# Patient Record
Sex: FEMALE | Race: BLACK OR AFRICAN AMERICAN | NOT HISPANIC OR LATINO | Employment: UNEMPLOYED | URBAN - METROPOLITAN AREA
[De-identification: names, ages, dates, MRNs, and addresses within clinical notes are randomized per-mention and may not be internally consistent; named-entity substitution may affect disease eponyms.]

---

## 2017-02-13 RX ORDER — CYCLOSPORINE 25 MG/1
CAPSULE ORAL
Qty: 540 CAPSULE | Refills: 3 | Status: SHIPPED | OUTPATIENT
Start: 2017-02-13 | End: 2017-06-08 | Stop reason: SDUPTHER

## 2017-04-16 ENCOUNTER — TELEPHONE (OUTPATIENT)
Dept: TRANSPLANT | Age: 67
End: 2017-04-16

## 2017-04-18 ENCOUNTER — TELEPHONE (OUTPATIENT)
Dept: TRANSPLANT | Age: 67
End: 2017-04-18

## 2017-05-01 ENCOUNTER — HOSPITAL ENCOUNTER (OUTPATIENT)
Dept: MAMMOGRAPHY | Age: 67
Discharge: HOME | End: 2017-05-01
Attending: INTERNAL MEDICINE
Payer: MEDICARE

## 2017-05-01 DIAGNOSIS — Z12.31 VISIT FOR SCREENING MAMMOGRAM: ICD-10-CM

## 2017-05-01 PROCEDURE — 77063 BREAST TOMOSYNTHESIS BI: CPT

## 2017-05-03 ENCOUNTER — APPOINTMENT (OUTPATIENT)
Dept: TRANSPLANT | Age: 67
End: 2017-05-03
Attending: INTERNAL MEDICINE

## 2017-05-03 ENCOUNTER — APPOINTMENT (OUTPATIENT)
Dept: LAB | Age: 67
End: 2017-05-03
Attending: INTERNAL MEDICINE

## 2017-05-04 ENCOUNTER — APPOINTMENT (OUTPATIENT)
Dept: ELECTROPHYSIOLOGY | Age: 67
End: 2017-05-04
Attending: INTERNAL MEDICINE

## 2017-05-26 DIAGNOSIS — Z95.0 PACEMAKER: Primary | ICD-10-CM

## 2017-06-01 ENCOUNTER — APPOINTMENT (OUTPATIENT)
Dept: LAB | Age: 67
End: 2017-06-01
Attending: INTERNAL MEDICINE

## 2017-06-01 ENCOUNTER — APPOINTMENT (OUTPATIENT)
Dept: ELECTROPHYSIOLOGY | Age: 67
End: 2017-06-01
Attending: INTERNAL MEDICINE

## 2017-06-01 ENCOUNTER — APPOINTMENT (OUTPATIENT)
Dept: TRANSPLANT | Age: 67
End: 2017-06-01
Attending: INTERNAL MEDICINE

## 2017-06-01 ENCOUNTER — TELEPHONE (OUTPATIENT)
Dept: TRANSPLANT | Age: 67
End: 2017-06-01

## 2017-06-08 ENCOUNTER — TELEPHONE (OUTPATIENT)
Dept: TRANSPLANT | Age: 67
End: 2017-06-08

## 2017-06-08 ENCOUNTER — HOSPITAL ENCOUNTER (OUTPATIENT)
Dept: LAB | Age: 67
Discharge: HOME | End: 2017-06-08
Attending: INTERNAL MEDICINE

## 2017-06-08 ENCOUNTER — OFFICE VISIT (OUTPATIENT)
Dept: TRANSPLANT | Age: 67
End: 2017-06-08
Attending: INTERNAL MEDICINE

## 2017-06-08 ENCOUNTER — HOSPITAL ENCOUNTER (OUTPATIENT)
Dept: GENERAL RADIOLOGY | Age: 67
Discharge: HOME | End: 2017-06-08
Attending: INTERNAL MEDICINE

## 2017-06-08 VITALS
RESPIRATION RATE: 20 BRPM | WEIGHT: 124.2 LBS | DIASTOLIC BLOOD PRESSURE: 100 MMHG | SYSTOLIC BLOOD PRESSURE: 180 MMHG | BODY MASS INDEX: 22.72 KG/M2 | HEART RATE: 85 BPM | OXYGEN SATURATION: 99 %

## 2017-06-08 DIAGNOSIS — Z94.1 HEART TRANSPLANT, ORTHOTOPIC, STATUS (CMD): Primary | ICD-10-CM

## 2017-06-08 DIAGNOSIS — Z94.1 HEART TRANSPLANT, ORTHOTOPIC, STATUS (CMD): ICD-10-CM

## 2017-06-08 LAB
ANION GAP SERPL CALC-SCNC: 13 MMOL/L (ref 10–20)
BASOPHILS # BLD AUTO: 0 K/MCL (ref 0–0.3)
BASOPHILS NFR BLD AUTO: 0 %
BUN SERPL-MCNC: 42 MG/DL (ref 6–20)
BUN/CREAT SERPL: 18 (ref 7–25)
CALCIUM SERPL-MCNC: 9.3 MG/DL (ref 8.4–10.2)
CHLORIDE SERPL-SCNC: 106 MMOL/L (ref 98–107)
CO2 SERPL-SCNC: 27 MMOL/L (ref 21–32)
CREAT SERPL-MCNC: 2.39 MG/DL (ref 0.51–0.95)
CYCLOSPORINE BLD-MCNC: 171 NG/ML (ref 100–400)
DIFFERENTIAL METHOD BLD: ABNORMAL
EOSINOPHIL # BLD AUTO: 0.1 K/MCL (ref 0.1–0.5)
EOSINOPHIL NFR SPEC: 3 %
ERYTHROCYTE [DISTWIDTH] IN BLOOD: 13.7 % (ref 11–15)
GLUCOSE SERPL-MCNC: 84 MG/DL (ref 65–99)
HCT VFR BLD CALC: 31.8 % (ref 36–46.5)
HGB BLD-MCNC: 9.2 G/DL (ref 12–15.5)
LYMPHOCYTES # BLD MANUAL: 0.8 K/MCL (ref 1–4)
LYMPHOCYTES NFR BLD MANUAL: 21 %
MAGNESIUM SERPL-MCNC: 1.8 MG/DL (ref 1.7–2.4)
MCH RBC QN AUTO: 24.9 PG (ref 26–34)
MCHC RBC AUTO-ENTMCNC: 28.9 G/DL (ref 32–36.5)
MCV RBC AUTO: 85.9 FL (ref 78–100)
MONOCYTES # BLD MANUAL: 0.3 K/MCL (ref 0.3–0.9)
MONOCYTES NFR BLD MANUAL: 7 %
NEUTROPHILS # BLD AUTO: 2.6 K/MCL (ref 1.8–7.7)
NEUTROPHILS NFR BLD AUTO: 69 %
PLATELET # BLD: 299 K/MCL (ref 140–450)
POTASSIUM SERPL-SCNC: 4.4 MMOL/L (ref 3.4–5.1)
RBC # BLD: 3.7 MIL/MCL (ref 4–5.2)
SODIUM SERPL-SCNC: 142 MMOL/L (ref 135–145)
WBC # BLD: 3.8 K/MCL (ref 4.2–11)

## 2017-06-08 PROCEDURE — 71020 XR CHEST PA AND LATERAL: CPT

## 2017-06-08 PROCEDURE — 10004026 HB COUNTER RECIPIENT HEART PHASE 6

## 2017-06-08 PROCEDURE — 36415 COLL VENOUS BLD VENIPUNCTURE: CPT

## 2017-06-08 PROCEDURE — 85025 COMPLETE CBC W/AUTO DIFF WBC: CPT

## 2017-06-08 PROCEDURE — 71020 XR CHEST PA AND LATERAL: CPT | Performed by: RADIOLOGY

## 2017-06-08 PROCEDURE — 80048 BASIC METABOLIC PNL TOTAL CA: CPT

## 2017-06-08 PROCEDURE — 83735 ASSAY OF MAGNESIUM: CPT

## 2017-06-08 PROCEDURE — 99212 OFFICE O/P EST SF 10 MIN: CPT

## 2017-06-08 PROCEDURE — 99214 OFFICE O/P EST MOD 30 MIN: CPT | Performed by: INTERNAL MEDICINE

## 2017-06-08 PROCEDURE — 80158 DRUG ASSAY CYCLOSPORINE: CPT

## 2017-06-08 RX ORDER — CYCLOSPORINE 25 MG/1
CAPSULE, LIQUID FILLED ORAL
Qty: 450 CAPSULE | Refills: 3 | Status: SHIPPED | OUTPATIENT
Start: 2017-06-08 | End: 2018-09-14 | Stop reason: DRUGHIGH

## 2017-06-08 RX ORDER — NIFEDIPINE 30 MG/1
30 TABLET, FILM COATED, EXTENDED RELEASE ORAL 2 TIMES DAILY
Qty: 180 TABLET | Refills: 3 | Status: SHIPPED | OUTPATIENT
Start: 2017-06-08 | End: 2017-08-14 | Stop reason: SDUPTHER

## 2017-06-08 RX ORDER — CYCLOSPORINE 25 MG/1
75 CAPSULE, LIQUID FILLED ORAL 2 TIMES DAILY
Qty: 540 CAPSULE | Refills: 3 | Status: SHIPPED | OUTPATIENT
Start: 2017-06-08 | End: 2017-06-08 | Stop reason: DRUGHIGH

## 2017-06-09 ENCOUNTER — TELEPHONE (OUTPATIENT)
Dept: TRANSPLANT | Age: 67
End: 2017-06-09

## 2017-06-12 ENCOUNTER — IMAGING SERVICES (OUTPATIENT)
Dept: CV DIAGNOSTICS | Age: 67
End: 2017-06-12
Attending: INTERNAL MEDICINE

## 2017-06-12 ENCOUNTER — TELEPHONE (OUTPATIENT)
Dept: TRANSPLANT | Age: 67
End: 2017-06-12

## 2017-06-12 DIAGNOSIS — Z94.1 HEART TRANSPLANT, ORTHOTOPIC, STATUS (CMD): ICD-10-CM

## 2017-06-12 LAB
AORTIC VALVE AREA: 2.4 CM2
ASCENDING AORTA (AAD): 2.9 CM
AV MEAN GRADIENT (AVMG): 3.5 MMHG
AV PEAK GRADIENT (AVPG): 6.7 MMHG
AV PEAK VELOCITY (AVPV): 1.3 M/S
DOP CALC LVOT PEAK VEL (LVOTPV): 1 M/S
EST RIGHT VENT SYSTOLIC PRESSURE BY TRICUSPID REGURGITATION JET (RVSP): 22.1 MMHG
INTERVENTRICULAR SEPTUM IN END DIASTOLE (IVSD): 1.2 CM
LEFT INTERNAL DIMENSION IN SYSTOLE (LVSD): 2.5 CM
LEFT VENTRICULAR INTERNAL DIMENSION IN DIASTOLE (LVDD): 4 CM
LEFT VENTRICULAR POSTERIOR WALL IN END DIASTOLE (LVPW): 1.2 CM
LV EF: 64 %
LVOT 2D (LVOTD): 2 CM
LVOT VTI (LVOTVTI): 19.7 CM
MV PEAK A VELOCITY (MVPAV): 0.5 M/S
MV PEAK E VELOCITY (MVPEV): 0.7 M/S
PV PEAK VELOCITY (PVPV): 0.9 M/S
RV TISSUE DOPPLER FREE WALL HEART RATE (RVSTV): 0.1 M/S
TRICUSPID VALVE PEAK REGURGITATION VELOCITY (TRPV): 2.2 M/S
TV ESTIMATED RIGHT ARTERIAL PRESSURE (RAP): 3 MMHG

## 2017-06-12 PROCEDURE — 0399T ECHO M-MODE/2D/DOPPLER (ROUTINE): CPT

## 2017-06-12 PROCEDURE — 76376 3D RENDER W/INTRP POSTPROCES: CPT | Performed by: INTERNAL MEDICINE

## 2017-06-12 PROCEDURE — 0399T ECHO M-MODE/2D/DOPPLER (ROUTINE): CPT | Performed by: INTERNAL MEDICINE

## 2017-06-12 PROCEDURE — 93306 TTE W/DOPPLER COMPLETE: CPT | Performed by: INTERNAL MEDICINE

## 2017-06-13 ENCOUNTER — CLINICAL ABSTRACT (OUTPATIENT)
Dept: TRANSPLANT | Age: 67
End: 2017-06-13

## 2017-06-19 ENCOUNTER — APPOINTMENT (OUTPATIENT)
Dept: GENERAL RADIOLOGY | Age: 67
End: 2017-06-19
Attending: EMERGENCY MEDICINE

## 2017-06-19 ENCOUNTER — HOSPITAL ENCOUNTER (EMERGENCY)
Age: 67
Discharge: HOME | End: 2017-06-19
Attending: EMERGENCY MEDICINE

## 2017-06-19 ENCOUNTER — TELEPHONE (OUTPATIENT)
Dept: TRANSPLANT | Age: 67
End: 2017-06-19

## 2017-06-19 ENCOUNTER — HOSPITAL ENCOUNTER (OUTPATIENT)
Dept: LAB | Age: 67
Discharge: HOME | End: 2017-06-19
Attending: INTERNAL MEDICINE

## 2017-06-19 ENCOUNTER — FOLLOW UP (OUTPATIENT)
Dept: TRANSPLANT | Age: 67
End: 2017-06-19
Attending: INTERNAL MEDICINE

## 2017-06-19 VITALS
HEIGHT: 62 IN | HEART RATE: 86 BPM | TEMPERATURE: 97.6 F | SYSTOLIC BLOOD PRESSURE: 148 MMHG | WEIGHT: 124.9 LBS | DIASTOLIC BLOOD PRESSURE: 62 MMHG | BODY MASS INDEX: 22.98 KG/M2 | OXYGEN SATURATION: 100 %

## 2017-06-19 VITALS
HEART RATE: 80 BPM | TEMPERATURE: 98.4 F | SYSTOLIC BLOOD PRESSURE: 180 MMHG | DIASTOLIC BLOOD PRESSURE: 86 MMHG | OXYGEN SATURATION: 100 % | RESPIRATION RATE: 25 BRPM

## 2017-06-19 DIAGNOSIS — Z94.1 HEART TRANSPLANT, ORTHOTOPIC, STATUS (CMD): ICD-10-CM

## 2017-06-19 DIAGNOSIS — I10 BENIGN ESSENTIAL HYPERTENSION: ICD-10-CM

## 2017-06-19 DIAGNOSIS — N18.30 ANEMIA, CHRONIC RENAL FAILURE, STAGE 3 (MODERATE) (CMD): ICD-10-CM

## 2017-06-19 DIAGNOSIS — D63.1 ANEMIA, CHRONIC RENAL FAILURE, STAGE 3 (MODERATE) (CMD): ICD-10-CM

## 2017-06-19 DIAGNOSIS — J40 BRONCHITIS: Primary | ICD-10-CM

## 2017-06-19 DIAGNOSIS — N18.4 CKD (CHRONIC KIDNEY DISEASE) STAGE 4, GFR 15-29 ML/MIN (CMD): Primary | ICD-10-CM

## 2017-06-19 DIAGNOSIS — M85.89 OSTEOPENIA OF MULTIPLE SITES: ICD-10-CM

## 2017-06-19 DIAGNOSIS — D64.9 ANEMIA, UNSPECIFIED TYPE: ICD-10-CM

## 2017-06-19 LAB
ANION GAP BLD CALC-SCNC: 12 MMOL/L
ANION GAP SERPL CALC-SCNC: 15 MMOL/L (ref 10–20)
APPEARANCE UR: CLEAR
ATRIAL RATE (BPM): 82
BACTERIA #/AREA URNS HPF: NORMAL /HPF
BASE DEFICIT BLDV-SCNC: 2 MMOL/L (ref 0–2)
BASOPHILS # BLD AUTO: 0 K/MCL (ref 0–0.3)
BASOPHILS NFR BLD AUTO: 0 %
BILIRUB UR QL STRIP: NEGATIVE
BNP SERPL-MCNC: 93 PG/ML
BUN BLD-MCNC: 61 MG/DL (ref 6–20)
BUN SERPL-MCNC: 54 MG/DL (ref 6–20)
BUN/CREAT SERPL: 23 (ref 7–25)
CA-I BLD ISE-SCNC: 1.23 MMOL/L (ref 1.15–1.29)
CALCIUM SERPL-MCNC: 9.5 MG/DL (ref 8.4–10.2)
CHLORIDE BLD-SCNC: 106 MMOL/L (ref 98–107)
CHLORIDE SERPL-SCNC: 103 MMOL/L (ref 98–107)
CO2 BLD-SCNC: 25 MMOL/L (ref 19–24)
CO2 SERPL-SCNC: 23 MMOL/L (ref 21–32)
COLOR UR: ABNORMAL
CREAT ?TM UR-MCNC: 56.99 MG/DL
CREAT BLD-MCNC: 2.5 MG/DL (ref 0.51–0.95)
CREAT SERPL-MCNC: 2.38 MG/DL (ref 0.51–0.95)
CROSSMATCH EXPIRE: NORMAL
CYCLOSPORINE BLD-MCNC: 77 NG/ML (ref 100–400)
DIASTOLIC BLOOD PRESSURE: 92
DIFFERENTIAL METHOD BLD: ABNORMAL
EOSINOPHIL # BLD AUTO: 0.2 K/MCL (ref 0.1–0.5)
EOSINOPHIL NFR SPEC: 3 %
ERYTHROCYTE [DISTWIDTH] IN BLOOD: 14 % (ref 11–15)
GLUCOSE BLD-MCNC: 80 MG/DL (ref 65–99)
GLUCOSE SERPL-MCNC: 80 MG/DL (ref 65–99)
GLUCOSE UR STRIP-MCNC: NEGATIVE MG/DL
HCO3 BLDV-SCNC: 25 MMOL/L (ref 22–28)
HCT VFR BLD CALC: 31.9 % (ref 36–46.5)
HCT VFR BLD CALC: 35 % (ref 36–46.5)
HGB BLD-MCNC: 11.9 G/DL (ref 12–15.5)
HGB BLD-MCNC: 9.4 G/DL (ref 12–15.5)
HGB UR QL STRIP: NEGATIVE
HYALINE CASTS #/AREA URNS LPF: NORMAL /LPF (ref 0–5)
KETONES UR STRIP-MCNC: NEGATIVE MG/DL
LEUKOCYTE ESTERASE UR QL STRIP: ABNORMAL
LYMPHOCYTES # BLD MANUAL: 0.7 K/MCL (ref 1–4)
LYMPHOCYTES NFR BLD MANUAL: 13 %
MCH RBC QN AUTO: 25.2 PG (ref 26–34)
MCHC RBC AUTO-ENTMCNC: 29.5 G/DL (ref 32–36.5)
MCV RBC AUTO: 85.5 FL (ref 78–100)
MONOCYTES # BLD MANUAL: 0.2 K/MCL (ref 0.3–0.9)
MONOCYTES NFR BLD MANUAL: 5 %
NEUTROPHILS # BLD AUTO: 4.1 K/MCL (ref 1.8–7.7)
NEUTROPHILS NFR BLD AUTO: 79 %
NITRITE UR QL STRIP: NEGATIVE
P AXIS (DEGREES): 75
PCO2 BLDV: 50 MM HG (ref 38–51)
PH BLDV: 7.31 UNITS (ref 7.35–7.45)
PH UR STRIP: 5 UNITS (ref 5–7)
PLATELET # BLD: 245 K/MCL (ref 140–450)
PO2 BLDV: 20 MM HG (ref 35–42)
POTASSIUM BLD-SCNC: 4.6 MMOL/L (ref 3.4–5.1)
POTASSIUM SERPL-SCNC: 4.4 MMOL/L (ref 3.4–5.1)
PR-INTERVAL (MSEC): 214
PROT ?TM UR-MCNC: 22 MG/DL
PROT UR STRIP-MCNC: NEGATIVE MG/DL
PROT/CREAT UR: 386 MGPR/GCR
QRS-INTERVAL (MSEC): 144
QT-INTERVAL (MSEC): 400
QTC: 467
R AXIS (DEGREES): 107
RBC # BLD: 3.73 MIL/MCL (ref 4–5.2)
RBC #/AREA URNS HPF: NORMAL /HPF (ref 0–3)
REPORT TEXT: NORMAL
SAO2 % BLDV: 28 % (ref 60–80)
SODIUM BLD-SCNC: 138 MMOL/L (ref 135–145)
SODIUM SERPL-SCNC: 137 MMOL/L (ref 135–145)
SP GR UR STRIP: <1.005 (ref 1–1.03)
SPECIMEN SOURCE: ABNORMAL
SQUAMOUS #/AREA URNS HPF: NORMAL /HPF (ref 0–5)
SYSTOLIC BLOOD PRESSURE: 167
T AXIS (DEGREES): 63
TROPONIN I BLD-MCNC: <0.1 NG/ML
URNS CMNT MICRO: NORMAL
UROBILINOGEN UR STRIP-MCNC: 0.2 MG/DL (ref 0–1)
VENTRICULAR RATE EKG/MIN (BPM): 82
WBC # BLD: 5.2 K/MCL (ref 4.2–11)
WBC #/AREA URNS HPF: NORMAL /HPF (ref 0–5)

## 2017-06-19 PROCEDURE — 10002807 HB RX 258: Performed by: EMERGENCY MEDICINE

## 2017-06-19 PROCEDURE — 80048 BASIC METABOLIC PNL TOTAL CA: CPT

## 2017-06-19 PROCEDURE — 84156 ASSAY OF PROTEIN URINE: CPT

## 2017-06-19 PROCEDURE — 71010 XR CHEST AP OR PA: CPT

## 2017-06-19 PROCEDURE — 94640 AIRWAY INHALATION TREATMENT: CPT

## 2017-06-19 PROCEDURE — 99284 EMERGENCY DEPT VISIT MOD MDM: CPT

## 2017-06-19 PROCEDURE — 82803 BLOOD GASES ANY COMBINATION: CPT

## 2017-06-19 PROCEDURE — 80047 BASIC METABLC PNL IONIZED CA: CPT

## 2017-06-19 PROCEDURE — 93005 ELECTROCARDIOGRAM TRACING: CPT | Performed by: EMERGENCY MEDICINE

## 2017-06-19 PROCEDURE — 36415 COLL VENOUS BLD VENIPUNCTURE: CPT

## 2017-06-19 PROCEDURE — 83880 ASSAY OF NATRIURETIC PEPTIDE: CPT

## 2017-06-19 PROCEDURE — 81001 URINALYSIS AUTO W/SCOPE: CPT

## 2017-06-19 PROCEDURE — 96360 HYDRATION IV INFUSION INIT: CPT

## 2017-06-19 PROCEDURE — 10002803 HB RX 637

## 2017-06-19 PROCEDURE — 71010 XR CHEST AP OR PA: CPT | Performed by: RADIOLOGY

## 2017-06-19 PROCEDURE — 84484 ASSAY OF TROPONIN QUANT: CPT

## 2017-06-19 PROCEDURE — 85025 COMPLETE CBC W/AUTO DIFF WBC: CPT

## 2017-06-19 PROCEDURE — 10002803 HB RX 637: Performed by: EMERGENCY MEDICINE

## 2017-06-19 PROCEDURE — 10002801 HB RX 250 W/O HCPCS: Performed by: EMERGENCY MEDICINE

## 2017-06-19 RX ORDER — ALBUTEROL SULFATE 90 UG/1
2 AEROSOL, METERED RESPIRATORY (INHALATION) ONCE
Status: DISCONTINUED | OUTPATIENT
Start: 2017-06-19 | End: 2017-06-19 | Stop reason: HOSPADM

## 2017-06-19 RX ORDER — IPRATROPIUM BROMIDE AND ALBUTEROL SULFATE 2.5; .5 MG/3ML; MG/3ML
3 SOLUTION RESPIRATORY (INHALATION) ONCE
Status: COMPLETED | OUTPATIENT
Start: 2017-06-19 | End: 2017-06-19

## 2017-06-19 RX ORDER — ALBUTEROL SULFATE 90 UG/1
2 AEROSOL, METERED RESPIRATORY (INHALATION) EVERY 4 HOURS PRN
Qty: 8.5 G | Refills: 0 | Status: SHIPPED | OUTPATIENT
Start: 2017-06-19 | End: 2017-10-16 | Stop reason: CLARIF

## 2017-06-19 RX ORDER — AMOXICILLIN AND CLAVULANATE POTASSIUM 875; 125 MG/1; MG/1
1 TABLET, FILM COATED ORAL EVERY 12 HOURS
Qty: 20 TABLET | Refills: 0 | Status: SHIPPED | OUTPATIENT
Start: 2017-06-19 | End: 2017-06-20 | Stop reason: DRUGHIGH

## 2017-06-19 RX ORDER — AMOXICILLIN AND CLAVULANATE POTASSIUM 500; 125 MG/1; MG/1
1 TABLET, FILM COATED ORAL ONCE
Status: COMPLETED | OUTPATIENT
Start: 2017-06-19 | End: 2017-06-19

## 2017-06-19 RX ORDER — ALBUTEROL SULFATE 90 UG/1
AEROSOL, METERED RESPIRATORY (INHALATION)
Status: DISCONTINUED
Start: 2017-06-19 | End: 2017-06-19 | Stop reason: HOSPADM

## 2017-06-19 RX ORDER — PROMETHAZINE HYDROCHLORIDE AND CODEINE PHOSPHATE 6.25; 1 MG/5ML; MG/5ML
5 SYRUP ORAL 4 TIMES DAILY PRN
Qty: 120 ML | Refills: 0 | Status: SHIPPED | OUTPATIENT
Start: 2017-06-19 | End: 2017-10-16 | Stop reason: CLARIF

## 2017-06-19 RX ADMIN — AMOXICILLIN AND CLAVULANATE POTASSIUM 1 TABLET: 500; 125 TABLET, FILM COATED ORAL at 12:34

## 2017-06-19 RX ADMIN — IPRATROPIUM BROMIDE AND ALBUTEROL SULFATE 3 ML: .5; 3 SOLUTION RESPIRATORY (INHALATION) at 09:30

## 2017-06-19 RX ADMIN — SODIUM CHLORIDE 500 ML: 9 INJECTION, SOLUTION INTRAVENOUS at 11:52

## 2017-06-19 ASSESSMENT — ENCOUNTER SYMPTOMS
VOMITING: 0
CHILLS: 0
DIARRHEA: 0
CHEST TIGHTNESS: 0
ABDOMINAL PAIN: 0
FATIGUE: 0
HEADACHES: 0
SORE THROAT: 1
DIZZINESS: 0
EYE PAIN: 0
RHINORRHEA: 0
PSYCHIATRIC NEGATIVE: 1
EYE REDNESS: 0
FEVER: 0
NAUSEA: 0
TROUBLE SWALLOWING: 0
SHORTNESS OF BREATH: 1
COUGH: 1

## 2017-06-19 ASSESSMENT — PAIN SCALES - GENERAL
PAINLEVEL_OUTOF10: 0

## 2017-06-20 ENCOUNTER — TELEPHONE (OUTPATIENT)
Dept: TRANSPLANT | Age: 67
End: 2017-06-20

## 2017-06-20 DIAGNOSIS — J40 BRONCHITIS: Primary | ICD-10-CM

## 2017-06-20 LAB
BACTERIA UR CULT: NORMAL
REPORT STATUS (RPT): NORMAL
SPECIMEN SOURCE: NORMAL

## 2017-06-20 RX ORDER — AMOXICILLIN AND CLAVULANATE POTASSIUM 500; 125 MG/1; MG/1
1 TABLET, FILM COATED ORAL 2 TIMES DAILY
Qty: 20 TABLET | Refills: 0 | Status: SHIPPED | OUTPATIENT
Start: 2017-06-20 | End: 2017-06-30

## 2017-06-21 ENCOUNTER — TELEPHONE (OUTPATIENT)
Dept: TRANSPLANT | Age: 67
End: 2017-06-21

## 2017-06-29 ENCOUNTER — OFFICE VISIT (OUTPATIENT)
Dept: ELECTROPHYSIOLOGY | Age: 67
End: 2017-06-29
Attending: INTERNAL MEDICINE

## 2017-06-29 VITALS
HEIGHT: 62 IN | DIASTOLIC BLOOD PRESSURE: 96 MMHG | SYSTOLIC BLOOD PRESSURE: 170 MMHG | HEART RATE: 83 BPM | WEIGHT: 125 LBS | BODY MASS INDEX: 23 KG/M2

## 2017-06-29 DIAGNOSIS — I48.0 PAF (PAROXYSMAL ATRIAL FIBRILLATION) (CMD): Primary | ICD-10-CM

## 2017-06-29 DIAGNOSIS — Z95.0 PACEMAKER: ICD-10-CM

## 2017-06-29 DIAGNOSIS — I10 BENIGN ESSENTIAL HYPERTENSION: ICD-10-CM

## 2017-06-29 DIAGNOSIS — I42.0 DILATED CARDIOMYOPATHY (CMD): ICD-10-CM

## 2017-06-29 DIAGNOSIS — I44.2 CHB (COMPLETE HEART BLOCK) (CMD): ICD-10-CM

## 2017-06-29 DIAGNOSIS — I47.29 NONSUSTAINED VENTRICULAR TACHYCARDIA (CMD): ICD-10-CM

## 2017-06-29 DIAGNOSIS — Z94.1 HEART TRANSPLANT, ORTHOTOPIC, STATUS (CMD): ICD-10-CM

## 2017-06-29 PROCEDURE — 93280 PM DEVICE PROGR EVAL DUAL: CPT

## 2017-06-29 PROCEDURE — 99213 OFFICE O/P EST LOW 20 MIN: CPT | Performed by: INTERNAL MEDICINE

## 2017-06-29 PROCEDURE — 93280 PM DEVICE PROGR EVAL DUAL: CPT | Performed by: INTERNAL MEDICINE

## 2017-06-29 ASSESSMENT — ENCOUNTER SYMPTOMS
SLEEP DISTURBANCE: 0
DIZZINESS: 0
BLOOD IN STOOL: 0
FATIGUE: 0
SHORTNESS OF BREATH: 0
DIARRHEA: 0
HEADACHES: 0
APPETITE CHANGE: 0
CONSTIPATION: 0
FEVER: 0
CHILLS: 0
BACK PAIN: 0
ROS SKIN COMMENTS: DENIES DISCOMFORT AT DEVICE SITE

## 2017-07-01 RX ORDER — PREDNISONE 2.5 MG/1
2.5 TABLET ORAL
Qty: 90 TABLET | Refills: 3 | Status: SHIPPED | OUTPATIENT
Start: 2017-07-01 | End: 2021-03-23 | Stop reason: SDUPTHER

## 2017-07-01 RX ORDER — PREDNISONE 2.5 MG/1
TABLET ORAL
Qty: 90 TABLET | Refills: 0 | Status: CANCELLED | OUTPATIENT
Start: 2017-07-01

## 2017-07-13 ENCOUNTER — APPOINTMENT (OUTPATIENT)
Dept: ELECTROPHYSIOLOGY | Age: 67
End: 2017-07-13
Attending: INTERNAL MEDICINE

## 2017-08-03 ENCOUNTER — HOSPITAL ENCOUNTER (OUTPATIENT)
Dept: LAB | Age: 67
Discharge: HOME | End: 2017-08-03
Attending: INTERNAL MEDICINE

## 2017-08-03 ENCOUNTER — FOLLOW UP (OUTPATIENT)
Dept: TRANSPLANT | Age: 67
End: 2017-08-03
Attending: INTERNAL MEDICINE

## 2017-08-03 VITALS
HEIGHT: 62 IN | WEIGHT: 126.9 LBS | DIASTOLIC BLOOD PRESSURE: 74 MMHG | TEMPERATURE: 98 F | HEART RATE: 82 BPM | OXYGEN SATURATION: 99 % | BODY MASS INDEX: 23.35 KG/M2 | SYSTOLIC BLOOD PRESSURE: 162 MMHG

## 2017-08-03 DIAGNOSIS — I48.0 PAF (PAROXYSMAL ATRIAL FIBRILLATION) (CMD): ICD-10-CM

## 2017-08-03 DIAGNOSIS — Z94.1 HEART TRANSPLANT, ORTHOTOPIC, STATUS (CMD): ICD-10-CM

## 2017-08-03 DIAGNOSIS — I10 BENIGN ESSENTIAL HYPERTENSION: ICD-10-CM

## 2017-08-03 DIAGNOSIS — D84.9 IMMUNOSUPPRESSION (CMD): ICD-10-CM

## 2017-08-03 DIAGNOSIS — D64.9 ANEMIA, UNSPECIFIED TYPE: ICD-10-CM

## 2017-08-03 DIAGNOSIS — N18.4 CKD (CHRONIC KIDNEY DISEASE) STAGE 4, GFR 15-29 ML/MIN (CMD): Primary | ICD-10-CM

## 2017-08-03 DIAGNOSIS — N18.4 ANEMIA, CHRONIC RENAL FAILURE, STAGE 4 (SEVERE) (CMD): ICD-10-CM

## 2017-08-03 DIAGNOSIS — D63.1 ANEMIA, CHRONIC RENAL FAILURE, STAGE 4 (SEVERE) (CMD): ICD-10-CM

## 2017-08-03 DIAGNOSIS — N18.4 CKD (CHRONIC KIDNEY DISEASE) STAGE 4, GFR 15-29 ML/MIN (CMD): ICD-10-CM

## 2017-08-03 LAB
25(OH)D3+25(OH)D2 SERPL-MCNC: 31.1 NG/ML (ref 30–100)
ANION GAP SERPL CALC-SCNC: 15 MMOL/L (ref 10–20)
BUN SERPL-MCNC: 52 MG/DL (ref 6–20)
BUN/CREAT SERPL: 22 (ref 7–25)
CALCIUM SERPL-MCNC: 9.4 MG/DL (ref 8.4–10.2)
CHLORIDE SERPL-SCNC: 105 MMOL/L (ref 98–107)
CO2 SERPL-SCNC: 24 MMOL/L (ref 21–32)
CREAT SERPL-MCNC: 2.38 MG/DL (ref 0.51–0.95)
GLUCOSE SERPL-MCNC: 94 MG/DL (ref 65–99)
HAV IGG+IGM SER QL: POSITIVE
HAV IGM SER QL: NEGATIVE
HBV CORE IGG+IGM SER QL: NEGATIVE
HBV SURFACE AB SER QL: NEGATIVE
HBV SURFACE AB SERPL IA-ACNC: <3.1 IU/L
HBV SURFACE AG SER QL: NEGATIVE
HCV AB SER QL: NEGATIVE
PHOSPHATE SERPL-MCNC: 4.5 MG/DL (ref 2.4–4.7)
POTASSIUM SERPL-SCNC: 4.3 MMOL/L (ref 3.4–5.1)
PTH-INTACT SERPL-MCNC: 123 PG/ML (ref 14–72)
SODIUM SERPL-SCNC: 140 MMOL/L (ref 135–145)

## 2017-08-03 PROCEDURE — 84100 ASSAY OF PHOSPHORUS: CPT

## 2017-08-03 PROCEDURE — 87340 HEPATITIS B SURFACE AG IA: CPT

## 2017-08-03 PROCEDURE — 80048 BASIC METABOLIC PNL TOTAL CA: CPT

## 2017-08-03 PROCEDURE — 10004095 HB COUNTER, VISIT, NON TRANSPLANT

## 2017-08-03 PROCEDURE — 99211 OFF/OP EST MAY X REQ PHY/QHP: CPT

## 2017-08-03 PROCEDURE — 82306 VITAMIN D 25 HYDROXY: CPT

## 2017-08-03 PROCEDURE — 86709 HEPATITIS A IGM ANTIBODY: CPT

## 2017-08-03 PROCEDURE — 86704 HEP B CORE ANTIBODY TOTAL: CPT

## 2017-08-03 PROCEDURE — 83970 ASSAY OF PARATHORMONE: CPT

## 2017-08-03 PROCEDURE — 86708 HEPATITIS A ANTIBODY: CPT

## 2017-08-03 PROCEDURE — 86803 HEPATITIS C AB TEST: CPT

## 2017-08-03 PROCEDURE — 86706 HEP B SURFACE ANTIBODY: CPT

## 2017-08-03 PROCEDURE — 36415 COLL VENOUS BLD VENIPUNCTURE: CPT

## 2017-08-04 ENCOUNTER — TELEPHONE (OUTPATIENT)
Dept: TRANSPLANT | Age: 67
End: 2017-08-04

## 2017-08-15 RX ORDER — NIFEDIPINE 30 MG/1
TABLET, FILM COATED ORAL
Qty: 180 TABLET | Refills: 1 | Status: SHIPPED | OUTPATIENT
Start: 2017-08-15 | End: 2019-10-16 | Stop reason: SDUPTHER

## 2017-09-06 ENCOUNTER — TELEPHONE (OUTPATIENT)
Dept: TRANSPLANT | Age: 67
End: 2017-09-06

## 2017-09-06 ENCOUNTER — HOSPITAL ENCOUNTER (OUTPATIENT)
Dept: ULTRASOUND IMAGING | Age: 67
End: 2017-09-06
Attending: INTERNAL MEDICINE

## 2017-09-06 DIAGNOSIS — N18.30 CHRONIC KIDNEY DISEASE (CKD), STAGE III (MODERATE) (CMD): Primary | ICD-10-CM

## 2017-09-19 ENCOUNTER — APPOINTMENT (OUTPATIENT)
Dept: ULTRASOUND IMAGING | Age: 67
End: 2017-09-19
Attending: INTERNAL MEDICINE

## 2017-09-26 ENCOUNTER — TELEPHONE (OUTPATIENT)
Dept: ULTRASOUND IMAGING | Age: 67
End: 2017-09-26

## 2017-09-27 ENCOUNTER — APPOINTMENT (OUTPATIENT)
Dept: ULTRASOUND IMAGING | Age: 67
End: 2017-09-27
Attending: INTERNAL MEDICINE

## 2017-09-27 ENCOUNTER — TELEPHONE (OUTPATIENT)
Dept: ULTRASOUND IMAGING | Age: 67
End: 2017-09-27

## 2017-09-27 ENCOUNTER — HOSPITAL ENCOUNTER (OUTPATIENT)
Dept: ULTRASOUND IMAGING | Age: 67
Discharge: HOME | End: 2017-09-27
Attending: INTERNAL MEDICINE

## 2017-09-27 ENCOUNTER — CLINICAL ABSTRACT (OUTPATIENT)
Dept: TRANSPLANT | Age: 67
End: 2017-09-27

## 2017-09-27 DIAGNOSIS — N18.4 CHRONIC KIDNEY DISEASE, STAGE IV (SEVERE) (CMD): Primary | ICD-10-CM

## 2017-09-27 DIAGNOSIS — N32.89 BLADDER WALL THICKENING: Primary | ICD-10-CM

## 2017-09-27 DIAGNOSIS — N18.4 CKD (CHRONIC KIDNEY DISEASE) STAGE 4, GFR 15-29 ML/MIN (CMD): ICD-10-CM

## 2017-09-27 DIAGNOSIS — N18.30 CHRONIC KIDNEY DISEASE (CKD), STAGE III (MODERATE) (CMD): ICD-10-CM

## 2017-09-27 PROCEDURE — 76770 US EXAM ABDO BACK WALL COMP: CPT

## 2017-09-27 PROCEDURE — 76857 US EXAM PELVIC LIMITED: CPT

## 2017-09-27 PROCEDURE — 76770 US EXAM ABDO BACK WALL COMP: CPT | Performed by: RADIOLOGY

## 2017-09-28 ENCOUNTER — TELEPHONE (OUTPATIENT)
Dept: TRANSPLANT | Age: 67
End: 2017-09-28

## 2017-09-28 PROBLEM — Z91.89 STROKE RISK: Status: ACTIVE | Noted: 2017-09-28

## 2017-10-09 ENCOUNTER — TELEPHONE (OUTPATIENT)
Dept: TRANSPLANT | Age: 67
End: 2017-10-09

## 2017-10-10 ENCOUNTER — APPOINTMENT (OUTPATIENT)
Dept: LAB | Age: 67
End: 2017-10-10

## 2017-10-10 ENCOUNTER — OFFICE VISIT (OUTPATIENT)
Dept: UROLOGY | Age: 67
End: 2017-10-10
Attending: INTERNAL MEDICINE

## 2017-10-10 VITALS — BODY MASS INDEX: 21.34 KG/M2 | HEIGHT: 64 IN | WEIGHT: 125 LBS

## 2017-10-10 DIAGNOSIS — N32.89 BLADDER WALL THICKENING: Primary | ICD-10-CM

## 2017-10-10 DIAGNOSIS — R30.0 DYSURIA: ICD-10-CM

## 2017-10-10 LAB
APPEARANCE UR: CLEAR
BILIRUB UR QL: ABNORMAL
COLOR UR: YELLOW
GLUCOSE UR-MCNC: ABNORMAL MG/DL
HGB UR QL: ABNORMAL
KETONES UR-MCNC: ABNORMAL MG/DL
LEUKOCYTE ESTERASE UR QL STRIP: ABNORMAL
NITRITE UR QL: ABNORMAL
PH UR: 5 [PH]
PROT UR QL: ABNORMAL
SP GR UR: 1.02
SPECIMEN SOURCE: ABNORMAL
UROBILINOGEN UR QL: ABNORMAL

## 2017-10-10 PROCEDURE — 99243 OFF/OP CNSLTJ NEW/EST LOW 30: CPT | Performed by: UROLOGY

## 2017-10-10 PROCEDURE — 81002 URINALYSIS NONAUTO W/O SCOPE: CPT | Performed by: UROLOGY

## 2017-10-12 LAB
BACTERIA UR CULT: NORMAL
REPORT STATUS (RPT): NORMAL
SPECIMEN SOURCE: NORMAL

## 2017-10-16 ENCOUNTER — OFFICE VISIT (OUTPATIENT)
Dept: UROLOGY | Age: 67
End: 2017-10-16

## 2017-10-16 VITALS
TEMPERATURE: 97.5 F | DIASTOLIC BLOOD PRESSURE: 90 MMHG | BODY MASS INDEX: 22.82 KG/M2 | HEIGHT: 62 IN | SYSTOLIC BLOOD PRESSURE: 150 MMHG | WEIGHT: 124 LBS

## 2017-10-16 DIAGNOSIS — R39.9 GU (GENITOURINARY) SYMPTOMS: Primary | ICD-10-CM

## 2017-10-16 LAB
APPEARANCE UR: CLEAR
BILIRUB UR QL: NORMAL
COLOR UR: YELLOW
GLUCOSE UR-MCNC: NORMAL MG/DL
HGB UR QL: NORMAL
KETONES UR-MCNC: NORMAL MG/DL
LEUKOCYTE ESTERASE UR QL STRIP: NORMAL
NITRITE UR QL: NORMAL
PH UR: 6 [PH]
PROT UR QL: NORMAL
SP GR UR: 1.01
SPECIMEN SOURCE: NORMAL
UROBILINOGEN UR QL: NORMAL

## 2017-10-16 PROCEDURE — 81002 URINALYSIS NONAUTO W/O SCOPE: CPT | Performed by: UROLOGY

## 2017-10-16 PROCEDURE — 52000 CYSTOURETHROSCOPY: CPT | Performed by: UROLOGY

## 2017-11-02 ENCOUNTER — OFFICE VISIT (OUTPATIENT)
Dept: ELECTROPHYSIOLOGY | Age: 67
End: 2017-11-02
Attending: INTERNAL MEDICINE

## 2017-11-02 VITALS
WEIGHT: 125 LBS | HEART RATE: 80 BPM | DIASTOLIC BLOOD PRESSURE: 76 MMHG | SYSTOLIC BLOOD PRESSURE: 140 MMHG | BODY MASS INDEX: 22.15 KG/M2 | HEIGHT: 63 IN

## 2017-11-02 DIAGNOSIS — Z95.0 PACEMAKER: ICD-10-CM

## 2017-11-02 DIAGNOSIS — I42.0 DILATED CARDIOMYOPATHY (CMD): ICD-10-CM

## 2017-11-02 DIAGNOSIS — Z94.1 HEART TRANSPLANT, ORTHOTOPIC, STATUS (CMD): ICD-10-CM

## 2017-11-02 DIAGNOSIS — I47.29 NONSUSTAINED VENTRICULAR TACHYCARDIA (CMD): ICD-10-CM

## 2017-11-02 DIAGNOSIS — I44.2 CHB (COMPLETE HEART BLOCK) (CMD): Primary | ICD-10-CM

## 2017-11-02 DIAGNOSIS — Z91.89 STROKE RISK: ICD-10-CM

## 2017-11-02 PROCEDURE — 93280 PM DEVICE PROGR EVAL DUAL: CPT

## 2017-11-02 PROCEDURE — 93280 PM DEVICE PROGR EVAL DUAL: CPT | Performed by: INTERNAL MEDICINE

## 2017-11-10 ENCOUNTER — TELEPHONE (OUTPATIENT)
Dept: TRANSPLANT | Age: 67
End: 2017-11-10

## 2017-11-13 ENCOUNTER — TELEPHONE (OUTPATIENT)
Dept: TRANSPLANT | Age: 67
End: 2017-11-13

## 2017-11-13 ENCOUNTER — APPOINTMENT (OUTPATIENT)
Dept: LAB | Age: 67
End: 2017-11-13
Attending: INTERNAL MEDICINE

## 2017-11-13 ENCOUNTER — APPOINTMENT (OUTPATIENT)
Dept: TRANSPLANT | Age: 67
End: 2017-11-13
Attending: INTERNAL MEDICINE

## 2017-11-21 ENCOUNTER — CLINICAL ABSTRACT (OUTPATIENT)
Dept: TRANSPLANT | Age: 67
End: 2017-11-21

## 2017-11-21 DIAGNOSIS — E78.00 HYPERCHOLESTEROLEMIA: ICD-10-CM

## 2017-11-21 DIAGNOSIS — Z94.1 HEART TRANSPLANT, ORTHOTOPIC, STATUS (CMD): Primary | ICD-10-CM

## 2017-11-21 DIAGNOSIS — Z12.31 ENCOUNTER FOR SCREENING MAMMOGRAM FOR HIGH-RISK PATIENT: ICD-10-CM

## 2017-11-21 DIAGNOSIS — E03.9 HYPOTHYROIDISM, UNSPECIFIED TYPE: ICD-10-CM

## 2017-11-21 DIAGNOSIS — N18.4 CKD (CHRONIC KIDNEY DISEASE), STAGE IV (CMD): ICD-10-CM

## 2017-11-21 DIAGNOSIS — R73.09 ELEVATED HEMOGLOBIN A1C: ICD-10-CM

## 2017-11-30 ENCOUNTER — TELEPHONE (OUTPATIENT)
Dept: TRANSPLANT | Age: 67
End: 2017-11-30

## 2017-12-04 ENCOUNTER — HOSPITAL ENCOUNTER (OUTPATIENT)
Dept: CT IMAGING | Age: 67
Discharge: HOME | End: 2017-12-04
Attending: PODIATRIST
Payer: MEDICARE

## 2017-12-04 DIAGNOSIS — M72.2 PLANTAR FASCIITIS: ICD-10-CM

## 2017-12-04 PROCEDURE — 73700 CT LOWER EXTREMITY W/O DYE: CPT

## 2017-12-04 RX ORDER — LEVOTHYROXINE SODIUM 0.07 MG/1
TABLET ORAL
Qty: 90 TABLET | Refills: 3 | Status: SHIPPED | OUTPATIENT
Start: 2017-12-04 | End: 2018-12-08 | Stop reason: SDUPTHER

## 2017-12-18 RX ORDER — MYCOPHENOLATE MOFETIL 250 MG/1
CAPSULE ORAL
Qty: 180 CAPSULE | Refills: 0 | Status: SHIPPED | OUTPATIENT
Start: 2017-12-18 | End: 2018-09-14 | Stop reason: DRUGHIGH

## 2017-12-19 ENCOUNTER — CLINICAL ABSTRACT (OUTPATIENT)
Dept: TRANSPLANT | Age: 67
End: 2017-12-19

## 2018-01-01 ENCOUNTER — EXTERNAL RECORD (OUTPATIENT)
Dept: OTHER | Age: 68
End: 2018-01-01

## 2018-01-05 LAB
ANION GAP SERPL CALC-SCNC: 14.2 MEQ/L (ref 10–20)
APTT PPP: 39.3 S (ref 22.9–35.8)
BASOPHILS NFR BLD: 0.6 % (ref 0–1)
BUN SERPL-MCNC: 42 MG/DL (ref 7–22)
CALCIUM SERPL-MCNC: 9.9 MG/DL (ref 8.5–10.5)
CHLORIDE SERPL-SCNC: 104 MEQ/L (ref 95–109)
CO2 SERPL-SCNC: 26 MEQ/L (ref 24–32)
CREAT SERPL-MCNC: 2.18 MG/DL (ref 0.5–1.4)
CYCLOSPORINE BLD-MCNC: 89.5 NG/ML (ref 50–350)
EOSINOPHIL # BLD: 0.1 K/CMM (ref 0–0.5)
EOSINOPHIL NFR BLD: 2.1 % (ref 0–4)
ERYTHROCYTE [DISTWIDTH] IN BLOOD: 15.4 % (ref 11.5–14.5)
GLUCOSE SERPL-MCNC: 87 MG/DL (ref 70–99)
HCT VFR BLD CALC: 31.8 % (ref 36–48)
HGB BLD-MCNC: 9.9 G/DL (ref 12–16)
INR PPP: 1 (ref 0.85–1.17)
LYMPHOCYTES # BLD: 1.1 K/CMM (ref 1–5.5)
LYMPHOCYTES NFR BLD: 22.2 % (ref 20–40)
MAGNESIUM SERPL-MCNC: 1.8 MG/DL (ref 1.8–2.4)
MCH RBC QN AUTO: 25.6 PG (ref 27–31)
MCHC RBC AUTO-ENTMCNC: 31.1 G/DL (ref 32–36)
MCV RBC AUTO: 82.4 FL (ref 80–98)
MONOCYTES # BLD: 0.3 K/CMM (ref 0–0.8)
MONOCYTES NFR BLD: 6.3 % (ref 2–12)
MPV (OFFPRE2): 8.8 FL (ref 7.4–10.4)
NEUTROPHILS # BLD: 3.5 K/CMM (ref 1.5–8.1)
NEUTROPHILS NFR BLD: 68.8 % (ref 45–75)
PLATELET # BLD: 172 K/CMM (ref 133–450)
POTASSIUM SERPL-SCNC: 4.2 MEQ/L (ref 3.5–5.1)
PROTHROMBIN TIME: 13.2 S (ref 12–14.7)
RBC # BLD: 3.86 M/CMM (ref 42–5.4)
SODIUM SERPL-SCNC: 140 MEQ/L (ref 135–145)
WBC # BLD: 5 K/CMM (ref 3.7–10.4)

## 2018-02-02 ENCOUNTER — HOSPITAL ENCOUNTER (OUTPATIENT)
Dept: LAB | Age: 68
Discharge: HOME | End: 2018-02-02
Payer: MEDICARE

## 2018-02-02 ENCOUNTER — CLINICAL ABSTRACT (OUTPATIENT)
Dept: TRANSPLANT | Age: 68
End: 2018-02-02

## 2018-02-02 PROCEDURE — 88304 TISSUE EXAM BY PATHOLOGIST: CPT | Performed by: PODIATRIST

## 2018-02-02 PROCEDURE — 88305 TISSUE EXAM BY PATHOLOGIST: CPT | Performed by: PODIATRIST

## 2018-02-08 ENCOUNTER — TELEPHONE (OUTPATIENT)
Dept: ELECTROPHYSIOLOGY | Age: 68
End: 2018-02-08

## 2018-02-19 RX ORDER — MYCOPHENOLATE MOFETIL 250 MG/1
CAPSULE ORAL
Qty: 180 CAPSULE | Refills: 1 | Status: SHIPPED | OUTPATIENT
Start: 2018-02-19 | End: 2018-09-14 | Stop reason: SDUPTHER

## 2018-02-19 RX ORDER — MYCOPHENOLATE MOFETIL 500 MG/1
TABLET ORAL
Qty: 180 TABLET | Refills: 1 | Status: SHIPPED | OUTPATIENT
Start: 2018-02-19 | End: 2018-08-26 | Stop reason: SDUPTHER

## 2018-02-20 RX ORDER — CYCLOSPORINE 25 MG/1
CAPSULE, LIQUID FILLED ORAL
Qty: 450 CAPSULE | Refills: 1 | Status: SHIPPED | OUTPATIENT
Start: 2018-02-20 | End: 2018-09-13 | Stop reason: SDUPTHER

## 2018-02-26 ENCOUNTER — REMOTE DEVICE CHECK (OUTPATIENT)
Dept: ELECTROPHYSIOLOGY | Age: 68
End: 2018-02-26
Attending: INTERNAL MEDICINE

## 2018-02-26 DIAGNOSIS — Z95.0 CARDIAC PACEMAKER IN SITU: Primary | ICD-10-CM

## 2018-02-26 PROCEDURE — 93296 REM INTERROG EVL PM/IDS: CPT

## 2018-02-26 PROCEDURE — 93294 REM INTERROG EVL PM/LDLS PM: CPT | Performed by: INTERNAL MEDICINE

## 2018-05-03 ENCOUNTER — APPOINTMENT (OUTPATIENT)
Dept: ELECTROPHYSIOLOGY | Age: 68
End: 2018-05-03
Attending: INTERNAL MEDICINE

## 2018-06-18 ENCOUNTER — HOSPITAL ENCOUNTER (OUTPATIENT)
Dept: MAMMOGRAPHY | Age: 68
Discharge: HOME | End: 2018-06-18
Attending: INTERNAL MEDICINE
Payer: MEDICARE

## 2018-06-18 DIAGNOSIS — Z12.31 VISIT FOR SCREENING MAMMOGRAM: ICD-10-CM

## 2018-06-18 PROCEDURE — 77063 BREAST TOMOSYNTHESIS BI: CPT

## 2018-07-03 ENCOUNTER — TELEPHONE (OUTPATIENT)
Dept: TRANSPLANT | Age: 68
End: 2018-07-03

## 2018-07-05 ENCOUNTER — TELEPHONE (OUTPATIENT)
Dept: TRANSPLANT | Age: 68
End: 2018-07-05

## 2018-08-09 ENCOUNTER — OFFICE VISIT (OUTPATIENT)
Dept: ELECTROPHYSIOLOGY | Age: 68
End: 2018-08-09
Attending: INTERNAL MEDICINE

## 2018-08-09 VITALS
SYSTOLIC BLOOD PRESSURE: 132 MMHG | HEIGHT: 62 IN | BODY MASS INDEX: 23 KG/M2 | HEART RATE: 81 BPM | WEIGHT: 125 LBS | DIASTOLIC BLOOD PRESSURE: 72 MMHG

## 2018-08-09 DIAGNOSIS — I10 BENIGN ESSENTIAL HYPERTENSION: ICD-10-CM

## 2018-08-09 DIAGNOSIS — I48.0 PAF (PAROXYSMAL ATRIAL FIBRILLATION) (CMD): ICD-10-CM

## 2018-08-09 DIAGNOSIS — Z94.1 HEART TRANSPLANT, ORTHOTOPIC, STATUS (CMD): ICD-10-CM

## 2018-08-09 DIAGNOSIS — Z95.0 CARDIAC PACEMAKER IN SITU: ICD-10-CM

## 2018-08-09 DIAGNOSIS — I47.29 NONSUSTAINED VENTRICULAR TACHYCARDIA (CMD): ICD-10-CM

## 2018-08-09 DIAGNOSIS — I42.0 DILATED CARDIOMYOPATHY (CMD): Primary | ICD-10-CM

## 2018-08-09 DIAGNOSIS — Z95.0 PACEMAKER: ICD-10-CM

## 2018-08-09 DIAGNOSIS — I44.2 CHB (COMPLETE HEART BLOCK) (CMD): ICD-10-CM

## 2018-08-09 DIAGNOSIS — Z91.89 STROKE RISK: ICD-10-CM

## 2018-08-09 PROCEDURE — 93280 PM DEVICE PROGR EVAL DUAL: CPT | Performed by: INTERNAL MEDICINE

## 2018-08-09 PROCEDURE — 93280 PM DEVICE PROGR EVAL DUAL: CPT

## 2018-08-09 RX ORDER — ERGOCALCIFEROL 1.25 MG/1
50000 CAPSULE ORAL
COMMUNITY
End: 2021-02-05 | Stop reason: SDUPTHER

## 2018-08-27 RX ORDER — MYCOPHENOLATE MOFETIL 500 MG/1
TABLET ORAL
Qty: 180 TABLET | Refills: 0 | Status: SHIPPED | OUTPATIENT
Start: 2018-08-27 | End: 2018-12-06 | Stop reason: SDUPTHER

## 2018-09-06 ENCOUNTER — TELEPHONE (OUTPATIENT)
Dept: TRANSPLANT | Age: 68
End: 2018-09-06

## 2018-09-06 RX ORDER — CYCLOSPORINE 25 MG/1
CAPSULE ORAL
Qty: 450 CAPSULE | Refills: 0 | OUTPATIENT
Start: 2018-09-06

## 2018-09-13 ENCOUNTER — TELEPHONE (OUTPATIENT)
Dept: TRANSPLANT | Age: 68
End: 2018-09-13

## 2018-09-13 DIAGNOSIS — Z94.1 HEART TRANSPLANT, ORTHOTOPIC, STATUS (CMD): Primary | ICD-10-CM

## 2018-09-13 RX ORDER — CYCLOSPORINE 25 MG/1
CAPSULE, LIQUID FILLED ORAL
Qty: 450 CAPSULE | Refills: 1 | Status: SHIPPED | OUTPATIENT
Start: 2018-09-13 | End: 2018-09-14 | Stop reason: DRUGHIGH

## 2018-09-14 ENCOUNTER — CLINICAL ABSTRACT (OUTPATIENT)
Dept: TRANSPLANT | Age: 68
End: 2018-09-14

## 2018-09-14 RX ORDER — ROSUVASTATIN CALCIUM 5 MG/1
5 TABLET, COATED ORAL DAILY
COMMUNITY
End: 2018-12-04 | Stop reason: CLARIF

## 2018-09-14 RX ORDER — CYCLOSPORINE 25 MG/1
75 CAPSULE, LIQUID FILLED ORAL 2 TIMES DAILY
COMMUNITY
End: 2018-09-18 | Stop reason: SDUPTHER

## 2018-09-14 RX ORDER — NIFEDIPINE 30 MG/1
30 TABLET, FILM COATED, EXTENDED RELEASE ORAL 2 TIMES DAILY
COMMUNITY
End: 2019-10-16 | Stop reason: SDUPTHER

## 2018-09-18 RX ORDER — CYCLOSPORINE 25 MG/1
75 CAPSULE, LIQUID FILLED ORAL 2 TIMES DAILY
Qty: 540 CAPSULE | Refills: 3 | Status: SHIPPED | OUTPATIENT
Start: 2018-09-18 | End: 2019-10-23 | Stop reason: SDUPTHER

## 2018-10-02 ENCOUNTER — CLINICAL ABSTRACT (OUTPATIENT)
Dept: TRANSPLANT | Age: 68
End: 2018-10-02

## 2018-10-02 LAB
ANION GAP SERPL CALC-SCNC: ABNORMAL MMOL/L
BUN SERPL-MCNC: 37 MG/DL (ref 7–25)
BUN/CREAT SERPL: 15 (ref 6–22)
CALCIUM SERPL-MCNC: 9.7 MG/DL (ref 8.6–10.4)
CHLORIDE SERPL-SCNC: 102 MMOL/L (ref 98–110)
CO2 SERPL-SCNC: 24 MMOL/L (ref 20–32)
CREAT SERPL-MCNC: 2.43 MG/DL (ref 0.6–0.93)
GLUCOSE SERPL-MCNC: 88 MG/DL (ref 65–99)
LENGTH OF FAST TIME PATIENT: ABNORMAL H
POTASSIUM SERPL-SCNC: 4.9 MMOL/L (ref 3.5–5.3)
SODIUM SERPL-SCNC: 136 MMOL/L (ref 135–146)

## 2018-10-05 ENCOUNTER — TELEPHONE (OUTPATIENT)
Dept: TRANSPLANT | Age: 68
End: 2018-10-05

## 2018-10-08 ENCOUNTER — TELEPHONE (OUTPATIENT)
Dept: TRANSPLANT | Age: 68
End: 2018-10-08

## 2018-11-13 ENCOUNTER — TELEPHONE (OUTPATIENT)
Dept: ELECTROPHYSIOLOGY | Age: 68
End: 2018-11-13

## 2018-11-16 ENCOUNTER — TELEPHONE (OUTPATIENT)
Dept: TRANSPLANT | Age: 68
End: 2018-11-16

## 2018-11-27 ENCOUNTER — TELEPHONE (OUTPATIENT)
Dept: TRANSPLANT | Age: 68
End: 2018-11-27

## 2018-11-27 ENCOUNTER — REMOTE DEVICE CHECK (OUTPATIENT)
Dept: ELECTROPHYSIOLOGY | Age: 68
End: 2018-11-27
Attending: INTERNAL MEDICINE

## 2018-11-27 DIAGNOSIS — Z94.1 HEART TRANSPLANT, ORTHOTOPIC, STATUS (CMD): ICD-10-CM

## 2018-11-27 DIAGNOSIS — Z95.0 CARDIAC PACEMAKER IN SITU: ICD-10-CM

## 2018-11-27 DIAGNOSIS — Z91.89 STROKE RISK: Primary | ICD-10-CM

## 2018-11-27 DIAGNOSIS — Z95.0 PACEMAKER: Primary | ICD-10-CM

## 2018-11-27 PROCEDURE — 93296 REM INTERROG EVL PM/IDS: CPT

## 2018-11-27 PROCEDURE — 93294 REM INTERROG EVL PM/LDLS PM: CPT | Performed by: INTERNAL MEDICINE

## 2018-11-29 PROCEDURE — 99283 EMERGENCY DEPT VISIT LOW MDM: CPT

## 2018-11-29 ASSESSMENT — PAIN SCALES - GENERAL: PAINLEVEL_OUTOF10: 0

## 2018-11-30 ENCOUNTER — HOSPITAL ENCOUNTER (EMERGENCY)
Age: 68
Discharge: HOME | End: 2018-11-30
Attending: EMERGENCY MEDICINE

## 2018-11-30 ENCOUNTER — APPOINTMENT (OUTPATIENT)
Dept: GENERAL RADIOLOGY | Age: 68
End: 2018-11-30
Attending: EMERGENCY MEDICINE

## 2018-11-30 VITALS
RESPIRATION RATE: 18 BRPM | WEIGHT: 107.81 LBS | HEIGHT: 62 IN | TEMPERATURE: 98.5 F | SYSTOLIC BLOOD PRESSURE: 151 MMHG | OXYGEN SATURATION: 100 % | DIASTOLIC BLOOD PRESSURE: 90 MMHG | BODY MASS INDEX: 19.84 KG/M2 | HEART RATE: 84 BPM

## 2018-11-30 DIAGNOSIS — R05.3 CHRONIC COUGH: Primary | ICD-10-CM

## 2018-11-30 LAB
ANION GAP SERPL CALC-SCNC: 12 MMOL/L (ref 10–20)
BASOPHILS # BLD AUTO: 0 K/MCL (ref 0–0.3)
BASOPHILS NFR BLD AUTO: 0 %
BUN SERPL-MCNC: 38 MG/DL (ref 6–20)
BUN/CREAT SERPL: 13 (ref 7–25)
CALCIUM SERPL-MCNC: 9.2 MG/DL (ref 8.4–10.2)
CHLORIDE SERPL-SCNC: 107 MMOL/L (ref 98–107)
CO2 SERPL-SCNC: 24 MMOL/L (ref 21–32)
CREAT SERPL-MCNC: 2.82 MG/DL (ref 0.51–0.95)
DIFFERENTIAL METHOD BLD: ABNORMAL
EOSINOPHIL # BLD AUTO: 0 K/MCL (ref 0.1–0.5)
EOSINOPHIL NFR SPEC: 0 %
ERYTHROCYTE [DISTWIDTH] IN BLOOD: 14.5 % (ref 11–15)
GLUCOSE SERPL-MCNC: 115 MG/DL (ref 65–99)
HCT VFR BLD CALC: 29.4 % (ref 36–46.5)
HGB BLD-MCNC: 8.6 G/DL (ref 12–15.5)
IMM GRANULOCYTES # BLD AUTO: 0 K/MCL (ref 0–0.2)
IMM GRANULOCYTES NFR BLD: 0 %
LYMPHOCYTES # BLD MANUAL: 0.7 K/MCL (ref 1–4)
LYMPHOCYTES NFR BLD MANUAL: 16 %
MCH RBC QN AUTO: 25.1 PG (ref 26–34)
MCHC RBC AUTO-ENTMCNC: 29.3 G/DL (ref 32–36.5)
MCV RBC AUTO: 85.7 FL (ref 78–100)
MONOCYTES # BLD MANUAL: 0.3 K/MCL (ref 0.3–0.9)
MONOCYTES NFR BLD MANUAL: 6 %
NEUTROPHILS # BLD: 3.5 K/MCL (ref 1.8–7.7)
NEUTROPHILS NFR BLD AUTO: 78 %
NRBC BLD MANUAL-RTO: 0 /100 WBC
NT-PROBNP SERPL-MCNC: 744 PG/ML
PLATELET # BLD: 196 K/MCL (ref 140–450)
POTASSIUM SERPL-SCNC: 5.1 MMOL/L (ref 3.4–5.1)
RBC # BLD: 3.43 MIL/MCL (ref 4–5.2)
SODIUM SERPL-SCNC: 138 MMOL/L (ref 135–145)
WBC # BLD: 4.5 K/MCL (ref 4.2–11)

## 2018-11-30 PROCEDURE — 36415 COLL VENOUS BLD VENIPUNCTURE: CPT

## 2018-11-30 PROCEDURE — 80048 BASIC METABOLIC PNL TOTAL CA: CPT

## 2018-11-30 PROCEDURE — 85025 COMPLETE CBC W/AUTO DIFF WBC: CPT

## 2018-11-30 PROCEDURE — 83880 ASSAY OF NATRIURETIC PEPTIDE: CPT

## 2018-11-30 PROCEDURE — 71045 X-RAY EXAM CHEST 1 VIEW: CPT

## 2018-11-30 PROCEDURE — 71045 X-RAY EXAM CHEST 1 VIEW: CPT | Performed by: RADIOLOGY

## 2018-11-30 ASSESSMENT — ENCOUNTER SYMPTOMS
ABDOMINAL PAIN: 0
HEADACHES: 0
FEVER: 1
DIARRHEA: 0
SORE THROAT: 0
EYE DISCHARGE: 0
NERVOUS/ANXIOUS: 0
COUGH: 1
CONSTIPATION: 0
VOMITING: 0
NAUSEA: 0
APPETITE CHANGE: 1
UNEXPECTED WEIGHT CHANGE: 1
SHORTNESS OF BREATH: 0
FATIGUE: 0
CHEST TIGHTNESS: 0

## 2018-11-30 ASSESSMENT — PAIN SCALES - GENERAL
PAINLEVEL_OUTOF10: 0

## 2018-12-04 ENCOUNTER — HOSPITAL ENCOUNTER (OUTPATIENT)
Dept: CT IMAGING | Age: 68
Discharge: HOME | End: 2018-12-04
Attending: INTERNAL MEDICINE

## 2018-12-04 ENCOUNTER — HOSPITAL ENCOUNTER (OUTPATIENT)
Dept: CV DIAGNOSTICS | Age: 68
Discharge: HOME | End: 2018-12-04
Attending: INTERNAL MEDICINE

## 2018-12-04 ENCOUNTER — HOSPITAL ENCOUNTER (OUTPATIENT)
Dept: LAB | Age: 68
Discharge: HOME | End: 2018-12-04
Attending: INTERNAL MEDICINE

## 2018-12-04 ENCOUNTER — OFFICE VISIT (OUTPATIENT)
Dept: TRANSPLANT | Age: 68
End: 2018-12-04
Attending: INTERNAL MEDICINE

## 2018-12-04 VITALS
BODY MASS INDEX: 19.11 KG/M2 | TEMPERATURE: 97.7 F | OXYGEN SATURATION: 100 % | SYSTOLIC BLOOD PRESSURE: 136 MMHG | HEART RATE: 75 BPM | WEIGHT: 104.5 LBS | DIASTOLIC BLOOD PRESSURE: 63 MMHG | RESPIRATION RATE: 18 BRPM

## 2018-12-04 DIAGNOSIS — Z94.1 STATUS POST TRANSPLANT, HEART (CMD): Primary | ICD-10-CM

## 2018-12-04 DIAGNOSIS — Z94.1 STATUS POST TRANSPLANT, HEART (CMD): ICD-10-CM

## 2018-12-04 DIAGNOSIS — Z94.1 HEART TRANSPLANT, ORTHOTOPIC, STATUS (CMD): ICD-10-CM

## 2018-12-04 LAB
ALBUMIN SERPL-MCNC: 4.1 G/DL (ref 3.6–5.1)
ALBUMIN/GLOB SERPL: 1.1 {RATIO} (ref 1–2.4)
ALP SERPL-CCNC: 59 UNITS/L (ref 45–117)
ALT SERPL-CCNC: 16 UNITS/L
ANION GAP SERPL CALC-SCNC: 11 MMOL/L (ref 10–20)
AST SERPL-CCNC: 12 UNITS/L
B PERT.PT PRMT NPH QL NAA+NON-PROBE: NOT DETECTED
BASOPHILS # BLD AUTO: 0 K/MCL (ref 0–0.3)
BASOPHILS NFR BLD AUTO: 0 %
BILIRUB SERPL-MCNC: 0.3 MG/DL (ref 0.2–1)
BUN SERPL-MCNC: 34 MG/DL (ref 6–20)
BUN/CREAT SERPL: 16 (ref 7–25)
C PNEUM DNA NPH QL NAA+NON-PROBE: NOT DETECTED
CALCIUM SERPL-MCNC: 9.6 MG/DL (ref 8.4–10.2)
CHLORIDE SERPL-SCNC: 107 MMOL/L (ref 98–107)
CO2 SERPL-SCNC: 26 MMOL/L (ref 21–32)
CREAT SERPL-MCNC: 2.17 MG/DL (ref 0.51–0.95)
CYCLOSPORINE BLD-MCNC: 143 NG/ML (ref 100–400)
DIFFERENTIAL METHOD BLD: ABNORMAL
EOSINOPHIL # BLD AUTO: 0.1 K/MCL (ref 0.1–0.5)
EOSINOPHIL NFR SPEC: 2 %
ERYTHROCYTE [DISTWIDTH] IN BLOOD: 14.7 % (ref 11–15)
FLUAV RNA NPH QL NAA+NON-PROBE: NOT DETECTED
FLUBV RNA NPH QL NAA+NON-PROBE: NOT DETECTED
GLOBULIN SER-MCNC: 3.8 G/DL (ref 2–4)
GLUCOSE SERPL-MCNC: 86 MG/DL (ref 65–99)
HADV DNA NPH QL NAA+NON-PROBE: NOT DETECTED
HCOV 229E RNA NPH QL NAA+NON-PROBE: NOT DETECTED
HCOV HKU1 RNA NPH QL NAA+NON-PROBE: NOT DETECTED
HCOV NL63 RNA NPH QL NAA+NON-PROBE: NOT DETECTED
HCOV OC43 RNA NPH QL NAA+NON-PROBE: NOT DETECTED
HCT VFR BLD CALC: 34.4 % (ref 36–46.5)
HGB BLD-MCNC: 9.7 G/DL (ref 12–15.5)
HMPV RNA NPH QL NAA+NON-PROBE: NOT DETECTED
HPIV1 RNA NPH QL NAA+NON-PROBE: NOT DETECTED
HPIV2 RNA NPH QL NAA+NON-PROBE: NOT DETECTED
HPIV3 RNA NPH QL NAA+NON-PROBE: NOT DETECTED
HPIV4 RNA NPH QL NAA+NON-PROBE: NOT DETECTED
IMM GRANULOCYTES # BLD AUTO: 0 K/MCL (ref 0–0.2)
IMM GRANULOCYTES NFR BLD: 0 %
LYMPHOCYTES # BLD MANUAL: 1 K/MCL (ref 1–4)
LYMPHOCYTES NFR BLD MANUAL: 20 %
M PNEUMO DNA NPH QL NAA+NON-PROBE: NOT DETECTED
MAGNESIUM SERPL-MCNC: 2 MG/DL (ref 1.7–2.4)
MCH RBC QN AUTO: 24.3 PG (ref 26–34)
MCHC RBC AUTO-ENTMCNC: 28.2 G/DL (ref 32–36.5)
MCV RBC AUTO: 86.2 FL (ref 78–100)
MONOCYTES # BLD MANUAL: 0.3 K/MCL (ref 0.3–0.9)
MONOCYTES NFR BLD MANUAL: 7 %
NEUTROPHILS # BLD: 3.6 K/MCL (ref 1.8–7.7)
NEUTROPHILS NFR BLD AUTO: 71 %
NRBC BLD MANUAL-RTO: 0 /100 WBC
PLATELET # BLD: 226 K/MCL (ref 140–450)
POTASSIUM SERPL-SCNC: 4.8 MMOL/L (ref 3.4–5.1)
PROT SERPL-MCNC: 7.9 G/DL (ref 6.4–8.2)
RBC # BLD: 3.99 MIL/MCL (ref 4–5.2)
RSV RNA NPH QL NAA+NON-PROBE: NOT DETECTED
RV+EV RNA NPH QL NAA+NON-PROBE: NOT DETECTED
SODIUM SERPL-SCNC: 139 MMOL/L (ref 135–145)
SPECIMEN SOURCE: NORMAL
TRANSPLANT PATIENT (RPAOE): NORMAL
WBC # BLD: 5 K/MCL (ref 4.2–11)

## 2018-12-04 PROCEDURE — 80158 DRUG ASSAY CYCLOSPORINE: CPT

## 2018-12-04 PROCEDURE — 85025 COMPLETE CBC W/AUTO DIFF WBC: CPT

## 2018-12-04 PROCEDURE — 71250 CT THORAX DX C-: CPT | Performed by: RADIOLOGY

## 2018-12-04 PROCEDURE — 80053 COMPREHEN METABOLIC PANEL: CPT

## 2018-12-04 PROCEDURE — 0399T MYOCARDIAL STRAIN IMAGING QUANTITATIVE ASSESSMENT OF MYOCARDIAL MECH: CPT | Performed by: INTERNAL MEDICINE

## 2018-12-04 PROCEDURE — 99214 OFFICE O/P EST MOD 30 MIN: CPT | Performed by: INTERNAL MEDICINE

## 2018-12-04 PROCEDURE — 99212 OFFICE O/P EST SF 10 MIN: CPT

## 2018-12-04 PROCEDURE — 93306 TTE W/DOPPLER COMPLETE: CPT | Performed by: INTERNAL MEDICINE

## 2018-12-04 PROCEDURE — 76376 3D RENDER W/INTRP POSTPROCES: CPT | Performed by: INTERNAL MEDICINE

## 2018-12-04 PROCEDURE — 76376 3D RENDER W/INTRP POSTPROCES: CPT | Performed by: RADIOLOGY

## 2018-12-04 PROCEDURE — 71250 CT THORAX DX C-: CPT

## 2018-12-04 PROCEDURE — 83735 ASSAY OF MAGNESIUM: CPT

## 2018-12-04 PROCEDURE — 36415 COLL VENOUS BLD VENIPUNCTURE: CPT

## 2018-12-04 PROCEDURE — 87798 DETECT AGENT NOS DNA AMP: CPT

## 2018-12-04 PROCEDURE — 0399T ECHO M-MODE/2D/DOPPLER (ROUTINE): CPT

## 2018-12-04 PROCEDURE — 10004026 HB COUNTER RECIPIENT HEART PHASE 6

## 2018-12-04 RX ORDER — CETIRIZINE HYDROCHLORIDE 5 MG/1
5 TABLET ORAL DAILY PRN
COMMUNITY
End: 2021-01-28 | Stop reason: ALTCHOICE

## 2018-12-04 RX ORDER — PRAVASTATIN SODIUM 10 MG
10 TABLET ORAL DAILY
COMMUNITY
End: 2019-10-22 | Stop reason: ALTCHOICE

## 2018-12-05 ENCOUNTER — HOSPITAL ENCOUNTER (OUTPATIENT)
Dept: LAB | Age: 68
Discharge: HOME | End: 2018-12-05
Attending: INTERNAL MEDICINE

## 2018-12-05 ENCOUNTER — OFFICE VISIT (OUTPATIENT)
Dept: TRANSPLANT | Age: 68
End: 2018-12-05
Attending: INTERNAL MEDICINE

## 2018-12-05 VITALS
DIASTOLIC BLOOD PRESSURE: 63 MMHG | HEART RATE: 75 BPM | SYSTOLIC BLOOD PRESSURE: 136 MMHG | WEIGHT: 104.5 LBS | OXYGEN SATURATION: 96 % | RESPIRATION RATE: 18 BRPM | BODY MASS INDEX: 19.11 KG/M2

## 2018-12-05 DIAGNOSIS — Z94.1 HEART TRANSPLANT, ORTHOTOPIC, STATUS (CMD): Primary | ICD-10-CM

## 2018-12-05 DIAGNOSIS — R50.81 FEVER IN OTHER DISEASES: ICD-10-CM

## 2018-12-05 LAB
ASCENDING AORTA (AAD): 3 CM
CRP SERPL-MCNC: <0.3 MG/DL
DOP CALC LVOT PEAK VEL (LVOTPV): 1 M/S
E WAVE DECELARATION TIME (MDT): 148.1 MS
EST RIGHT VENT SYSTOLIC PRESSURE BY TRICUSPID REGURGITATION JET (RVSP): 28.1 MMHG
INTERVENTRICULAR SEPTUM IN END DIASTOLE (IVSD): 1.1 CM
LDH SERPL L TO P-CCNC: 206 UNITS/L (ref 82–240)
LEFT VENTRICULAR INTERNAL DIMENSION IN DIASTOLE (LVDD): 3.9 CM
LEFT VENTRICULAR POSTERIOR WALL IN END DIASTOLE (LVPW): 1.1 CM
LV EF: 64 %
LVOT 2D (LVOTD): 1.9 CM
LVOT VTI (LVOTVTI): 19.7 CM
MV E TISSUE VEL LAT (MELV): 6.9 CM/S
MV E TISSUE VEL MED (MESV): 3.5 CM/S
MV E WAVE VEL/E TISSUE VEL MED(MSR): 20.6
MV PEAK A VELOCITY (MVPAV): 0.5 M/S
MV PEAK E VELOCITY (MVPEV): 0.7 M/S
RV TISSUE DOPPLER FREE WALL HEART RATE (RVSTV): 0.1 M/S
TRICUSPID VALVE PEAK REGURGITATION VELOCITY (TRPV): 2.5 M/S
TV ESTIMATED RIGHT ARTERIAL PRESSURE (RAP): 3 MMHG

## 2018-12-05 PROCEDURE — 86140 C-REACTIVE PROTEIN: CPT

## 2018-12-05 PROCEDURE — 99212 OFFICE O/P EST SF 10 MIN: CPT

## 2018-12-05 PROCEDURE — 87389 HIV-1 AG W/HIV-1&-2 AB AG IA: CPT

## 2018-12-05 PROCEDURE — 87799 DETECT AGENT NOS DNA QUANT: CPT

## 2018-12-05 PROCEDURE — 36415 COLL VENOUS BLD VENIPUNCTURE: CPT

## 2018-12-05 PROCEDURE — 99243 OFF/OP CNSLTJ NEW/EST LOW 30: CPT | Performed by: INTERNAL MEDICINE

## 2018-12-05 PROCEDURE — 87385 HISTOPLASMA CAPSUL AG IA: CPT

## 2018-12-05 PROCEDURE — 83615 LACTATE (LD) (LDH) ENZYME: CPT

## 2018-12-05 PROCEDURE — 10004026 HB COUNTER RECIPIENT HEART PHASE 6

## 2018-12-06 LAB — HIV 1+2 AB+HIV1 P24 AG SERPL QL IA: NONREACTIVE

## 2018-12-07 ENCOUNTER — TELEPHONE (OUTPATIENT)
Dept: TRANSPLANT | Age: 68
End: 2018-12-07

## 2018-12-07 LAB
CMV DNA # BLD NAA+PROBE: <150 IUNITS/ML
CMV DNA SERPL NAA+PROBE-LOG#: <2.18 IUNITS/ML
EBV DNA # BLD NAA+PROBE: NOT DETECTED COPIES/ML
EBV DNA SPEC NAA+PROBE-LOG#: NOT DETECTED COPIES/ML
SPECIMEN SOURCE: ABNORMAL

## 2018-12-07 RX ORDER — MYCOPHENOLATE MOFETIL 500 MG/1
500 TABLET ORAL 2 TIMES DAILY
Qty: 60 TABLET | Refills: 3 | Status: SHIPPED | OUTPATIENT
Start: 2018-12-07 | End: 2019-04-12 | Stop reason: SDUPTHER

## 2018-12-10 ENCOUNTER — TELEPHONE (OUTPATIENT)
Dept: TRANSPLANT | Age: 68
End: 2018-12-10

## 2018-12-10 LAB — H CAPSUL AG UR QL: NORMAL NG/ML

## 2018-12-11 RX ORDER — LEVOTHYROXINE SODIUM 0.07 MG/1
TABLET ORAL
Qty: 90 TABLET | Refills: 3 | Status: SHIPPED | OUTPATIENT
Start: 2018-12-11 | End: 2020-01-19 | Stop reason: SDUPTHER

## 2019-03-07 ENCOUNTER — APPOINTMENT (OUTPATIENT)
Dept: ELECTROPHYSIOLOGY | Age: 69
End: 2019-03-07
Attending: INTERNAL MEDICINE

## 2019-04-12 RX ORDER — MYCOPHENOLATE MOFETIL 500 MG/1
TABLET ORAL
Qty: 60 TABLET | Refills: 11 | Status: SHIPPED | OUTPATIENT
Start: 2019-04-12 | End: 2020-04-16 | Stop reason: SDUPTHER

## 2019-05-23 ENCOUNTER — APPOINTMENT (OUTPATIENT)
Dept: ELECTROPHYSIOLOGY | Age: 69
End: 2019-05-23
Attending: INTERNAL MEDICINE

## 2019-05-29 ENCOUNTER — TELEPHONE (OUTPATIENT)
Dept: TRANSPLANT | Age: 69
End: 2019-05-29

## 2019-05-29 DIAGNOSIS — Z94.1 HEART TRANSPLANT, ORTHOTOPIC, STATUS (CMD): Primary | ICD-10-CM

## 2019-05-30 ENCOUNTER — REMOTE DEVICE CHECK (OUTPATIENT)
Dept: ELECTROPHYSIOLOGY | Age: 69
End: 2019-05-30
Attending: INTERNAL MEDICINE

## 2019-05-31 ENCOUNTER — TELEPHONE (OUTPATIENT)
Dept: ELECTROPHYSIOLOGY | Age: 69
End: 2019-05-31

## 2019-06-05 ENCOUNTER — REMOTE DEVICE CHECK (OUTPATIENT)
Dept: ELECTROPHYSIOLOGY | Age: 69
End: 2019-06-05
Attending: INTERNAL MEDICINE

## 2019-06-05 DIAGNOSIS — Z95.0 PACEMAKER: Primary | ICD-10-CM

## 2019-06-05 PROCEDURE — 93296 REM INTERROG EVL PM/IDS: CPT

## 2019-06-05 PROCEDURE — 93294 REM INTERROG EVL PM/LDLS PM: CPT | Performed by: INTERNAL MEDICINE

## 2019-06-17 ENCOUNTER — TELEPHONE (OUTPATIENT)
Dept: TRANSPLANT | Age: 69
End: 2019-06-17

## 2019-06-19 ENCOUNTER — APPOINTMENT (OUTPATIENT)
Dept: LAB | Age: 69
End: 2019-06-19
Attending: NURSE PRACTITIONER

## 2019-06-19 ENCOUNTER — APPOINTMENT (OUTPATIENT)
Dept: TRANSPLANT | Age: 69
End: 2019-06-19
Attending: NURSE PRACTITIONER

## 2019-07-18 ENCOUNTER — HOSPITAL ENCOUNTER (OUTPATIENT)
Dept: MAMMOGRAPHY | Age: 69
Discharge: HOME | End: 2019-07-18
Attending: INTERNAL MEDICINE
Payer: MEDICARE

## 2019-07-18 DIAGNOSIS — Z12.31 VISIT FOR SCREENING MAMMOGRAM: ICD-10-CM

## 2019-07-18 PROCEDURE — 77063 BREAST TOMOSYNTHESIS BI: CPT

## 2019-07-30 ENCOUNTER — TELEPHONE (OUTPATIENT)
Dept: TRANSPLANT | Age: 69
End: 2019-07-30

## 2019-08-22 ENCOUNTER — OFFICE VISIT (OUTPATIENT)
Dept: ELECTROPHYSIOLOGY | Age: 69
End: 2019-08-22
Attending: INTERNAL MEDICINE

## 2019-08-22 ENCOUNTER — OFFICE VISIT (OUTPATIENT)
Dept: TRANSPLANT | Age: 69
End: 2019-08-22
Attending: NURSE PRACTITIONER

## 2019-08-22 ENCOUNTER — TELEPHONE (OUTPATIENT)
Dept: TRANSPLANT | Age: 69
End: 2019-08-22

## 2019-08-22 ENCOUNTER — HOSPITAL ENCOUNTER (OUTPATIENT)
Dept: LAB | Age: 69
Discharge: HOME | End: 2019-08-22
Attending: NURSE PRACTITIONER

## 2019-08-22 VITALS
DIASTOLIC BLOOD PRESSURE: 76 MMHG | SYSTOLIC BLOOD PRESSURE: 155 MMHG | WEIGHT: 122 LBS | BODY MASS INDEX: 22.31 KG/M2 | HEART RATE: 91 BPM | OXYGEN SATURATION: 98 % | RESPIRATION RATE: 18 BRPM

## 2019-08-22 VITALS
WEIGHT: 122 LBS | SYSTOLIC BLOOD PRESSURE: 120 MMHG | HEART RATE: 82 BPM | BODY MASS INDEX: 22.45 KG/M2 | HEIGHT: 62 IN | DIASTOLIC BLOOD PRESSURE: 70 MMHG

## 2019-08-22 DIAGNOSIS — I10 BENIGN ESSENTIAL HYPERTENSION: ICD-10-CM

## 2019-08-22 DIAGNOSIS — I10 ESSENTIAL (PRIMARY) HYPERTENSION: ICD-10-CM

## 2019-08-22 DIAGNOSIS — Z94.1 HEART TRANSPLANT, ORTHOTOPIC, STATUS (CMD): ICD-10-CM

## 2019-08-22 DIAGNOSIS — M89.9 DISORDER OF BONE: ICD-10-CM

## 2019-08-22 DIAGNOSIS — I47.29 NONSUSTAINED VENTRICULAR TACHYCARDIA (CMD): ICD-10-CM

## 2019-08-22 DIAGNOSIS — I42.0 DILATED CARDIOMYOPATHY (CMD): ICD-10-CM

## 2019-08-22 DIAGNOSIS — N18.4 CKD (CHRONIC KIDNEY DISEASE), STAGE IV (CMD): ICD-10-CM

## 2019-08-22 DIAGNOSIS — Z12.31 ENCOUNTER FOR SCREENING MAMMOGRAM FOR MALIGNANT NEOPLASM OF BREAST: ICD-10-CM

## 2019-08-22 DIAGNOSIS — Z95.0 PACEMAKER: ICD-10-CM

## 2019-08-22 DIAGNOSIS — E03.9 HYPOTHYROIDISM, UNSPECIFIED TYPE: ICD-10-CM

## 2019-08-22 DIAGNOSIS — E78.00 HYPERCHOLESTEROLEMIA: ICD-10-CM

## 2019-08-22 DIAGNOSIS — Z94.1 HEART TRANSPLANT, ORTHOTOPIC, STATUS (CMD): Primary | ICD-10-CM

## 2019-08-22 DIAGNOSIS — I44.2 CHB (COMPLETE HEART BLOCK) (CMD): Primary | ICD-10-CM

## 2019-08-22 DIAGNOSIS — R73.09 ELEVATED HEMOGLOBIN A1C: ICD-10-CM

## 2019-08-22 DIAGNOSIS — Z91.89 STROKE RISK: ICD-10-CM

## 2019-08-22 DIAGNOSIS — I48.0 PAF (PAROXYSMAL ATRIAL FIBRILLATION) (CMD): ICD-10-CM

## 2019-08-22 LAB
ANION GAP SERPL CALC-SCNC: 13 MMOL/L (ref 10–20)
BASOPHILS # BLD AUTO: 0 K/MCL (ref 0–0.3)
BASOPHILS NFR BLD AUTO: 0 %
BUN SERPL-MCNC: 39 MG/DL (ref 6–20)
BUN/CREAT SERPL: 20 (ref 7–25)
CALCIUM SERPL-MCNC: 9.7 MG/DL (ref 8.4–10.2)
CHLORIDE SERPL-SCNC: 108 MMOL/L (ref 98–107)
CO2 SERPL-SCNC: 24 MMOL/L (ref 21–32)
CREAT SERPL-MCNC: 1.92 MG/DL (ref 0.51–0.95)
CYCLOSPORINE BLD-MCNC: 90 NG/ML (ref 100–400)
DIFFERENTIAL METHOD BLD: ABNORMAL
EOSINOPHIL # BLD AUTO: 0.1 K/MCL (ref 0.1–0.5)
EOSINOPHIL NFR SPEC: 1 %
ERYTHROCYTE [DISTWIDTH] IN BLOOD: 14.6 % (ref 11–15)
GLUCOSE SERPL-MCNC: 80 MG/DL (ref 65–99)
HCT VFR BLD CALC: 35.2 % (ref 36–46.5)
HGB BLD-MCNC: 10.1 G/DL (ref 12–15.5)
IMM GRANULOCYTES # BLD AUTO: 0 K/MCL (ref 0–0.2)
IMM GRANULOCYTES NFR BLD: 0 %
LYMPHOCYTES # BLD MANUAL: 0.7 K/MCL (ref 1–4)
LYMPHOCYTES NFR BLD MANUAL: 8 %
MAGNESIUM SERPL-MCNC: 1.6 MG/DL (ref 1.7–2.4)
MCH RBC QN AUTO: 24.8 PG (ref 26–34)
MCHC RBC AUTO-ENTMCNC: 28.7 G/DL (ref 32–36.5)
MCV RBC AUTO: 86.5 FL (ref 78–100)
MONOCYTES # BLD MANUAL: 0.4 K/MCL (ref 0.3–0.9)
MONOCYTES NFR BLD MANUAL: 5 %
NEUTROPHILS # BLD: 6.9 K/MCL (ref 1.8–7.7)
NEUTROPHILS NFR BLD AUTO: 86 %
NRBC BLD MANUAL-RTO: 0 /100 WBC
PLATELET # BLD: 221 K/MCL (ref 140–450)
POTASSIUM SERPL-SCNC: 4.6 MMOL/L (ref 3.4–5.1)
RBC # BLD: 4.07 MIL/MCL (ref 4–5.2)
SODIUM SERPL-SCNC: 140 MMOL/L (ref 135–145)
WBC # BLD: 8 K/MCL (ref 4.2–11)

## 2019-08-22 PROCEDURE — 93280 PM DEVICE PROGR EVAL DUAL: CPT

## 2019-08-22 PROCEDURE — 10004026 HB COUNTER RECIPIENT HEART PHASE 6

## 2019-08-22 PROCEDURE — 99212 OFFICE O/P EST SF 10 MIN: CPT

## 2019-08-22 PROCEDURE — 85025 COMPLETE CBC W/AUTO DIFF WBC: CPT

## 2019-08-22 PROCEDURE — 99214 OFFICE O/P EST MOD 30 MIN: CPT | Performed by: NURSE PRACTITIONER

## 2019-08-22 PROCEDURE — 36415 COLL VENOUS BLD VENIPUNCTURE: CPT

## 2019-08-22 PROCEDURE — 83735 ASSAY OF MAGNESIUM: CPT

## 2019-08-22 PROCEDURE — 80158 DRUG ASSAY CYCLOSPORINE: CPT

## 2019-08-22 PROCEDURE — 80048 BASIC METABOLIC PNL TOTAL CA: CPT

## 2019-08-22 SDOH — HEALTH STABILITY: MENTAL HEALTH: HOW MANY STANDARD DRINKS CONTAINING ALCOHOL DO YOU HAVE ON A TYPICAL DAY?: 1 OR 2

## 2019-08-22 SDOH — HEALTH STABILITY: MENTAL HEALTH: HOW OFTEN DO YOU HAVE A DRINK CONTAINING ALCOHOL?: NEVER

## 2019-08-22 SDOH — HEALTH STABILITY: MENTAL HEALTH: HOW OFTEN DO YOU HAVE 6 OR MORE DRINKS ON ONE OCCASION?: NEVER

## 2019-08-22 ASSESSMENT — NEW YORK HEART ASSOCIATION (NYHA) CLASSIFICATION: NYHA FUNCTIONAL CLASS: I

## 2019-09-18 ENCOUNTER — HOSPITAL ENCOUNTER (OUTPATIENT)
Dept: CT IMAGING | Age: 69
Discharge: HOME | End: 2019-09-18
Attending: INTERNAL MEDICINE
Payer: MEDICARE

## 2019-09-18 DIAGNOSIS — R10.13 EPIGASTRIC PAIN: ICD-10-CM

## 2019-09-18 DIAGNOSIS — K55.9 VASCULAR INSUFFICIENCY OF INTESTINE (HCC): ICD-10-CM

## 2019-09-18 LAB — CREAT UR-MCNC: 1 MG/DL (ref 0.6–1.3)

## 2019-09-18 PROCEDURE — 82565 ASSAY OF CREATININE: CPT

## 2019-09-18 PROCEDURE — 74174 CTA ABD&PLVS W/CONTRAST: CPT

## 2019-09-18 PROCEDURE — 74011636320 HC RX REV CODE- 636/320: Performed by: INTERNAL MEDICINE

## 2019-09-18 RX ADMIN — IOPAMIDOL 100 ML: 755 INJECTION, SOLUTION INTRAVENOUS at 16:16

## 2019-09-23 ENCOUNTER — APPOINTMENT (OUTPATIENT)
Dept: MAMMOGRAPHY | Age: 69
End: 2019-09-23
Attending: NURSE PRACTITIONER

## 2019-09-23 ENCOUNTER — APPOINTMENT (OUTPATIENT)
Dept: GENERAL RADIOLOGY | Age: 69
End: 2019-09-23
Attending: NURSE PRACTITIONER

## 2019-09-23 ENCOUNTER — APPOINTMENT (OUTPATIENT)
Dept: NUCLEAR MEDICINE | Age: 69
End: 2019-09-23
Attending: NURSE PRACTITIONER

## 2019-10-16 RX ORDER — NIFEDIPINE 30 MG/1
30 TABLET, FILM COATED, EXTENDED RELEASE ORAL 2 TIMES DAILY
Qty: 180 TABLET | Refills: 3 | Status: SHIPPED | OUTPATIENT
Start: 2019-10-16 | End: 2019-10-16 | Stop reason: CLARIF

## 2019-10-16 RX ORDER — NIFEDIPINE 30 MG/1
30 TABLET, FILM COATED, EXTENDED RELEASE ORAL 2 TIMES DAILY
Qty: 180 TABLET | Refills: 3 | Status: SHIPPED | OUTPATIENT
Start: 2019-10-16 | End: 2020-11-30

## 2019-10-16 RX ORDER — NIFEDIPINE 30 MG/1
30 TABLET, FILM COATED, EXTENDED RELEASE ORAL 2 TIMES DAILY
COMMUNITY
End: 2019-10-16 | Stop reason: SDUPTHER

## 2019-10-21 ENCOUNTER — HOSPITAL ENCOUNTER (OUTPATIENT)
Dept: NUCLEAR MEDICINE | Age: 69
Discharge: HOME | End: 2019-10-21
Attending: NURSE PRACTITIONER

## 2019-10-21 ENCOUNTER — HOSPITAL ENCOUNTER (OUTPATIENT)
Dept: GENERAL RADIOLOGY | Age: 69
Discharge: HOME | End: 2019-10-21
Attending: NURSE PRACTITIONER

## 2019-10-21 ENCOUNTER — HOSPITAL ENCOUNTER (OUTPATIENT)
Dept: MAMMOGRAPHY | Age: 69
Discharge: HOME | End: 2019-10-21
Attending: NURSE PRACTITIONER

## 2019-10-21 DIAGNOSIS — M89.9 DISORDER OF BONE: ICD-10-CM

## 2019-10-21 DIAGNOSIS — Z12.31 ENCOUNTER FOR SCREENING MAMMOGRAM FOR MALIGNANT NEOPLASM OF BREAST: ICD-10-CM

## 2019-10-21 DIAGNOSIS — Z94.1 HEART TRANSPLANT, ORTHOTOPIC, STATUS (CMD): ICD-10-CM

## 2019-10-21 PROCEDURE — 77067 SCR MAMMO BI INCL CAD: CPT | Performed by: RADIOLOGY

## 2019-10-21 PROCEDURE — 71046 X-RAY EXAM CHEST 2 VIEWS: CPT | Performed by: RADIOLOGY

## 2019-10-21 PROCEDURE — 71046 X-RAY EXAM CHEST 2 VIEWS: CPT

## 2019-10-21 PROCEDURE — 77080 DXA BONE DENSITY AXIAL: CPT

## 2019-10-21 PROCEDURE — 77063 BREAST TOMOSYNTHESIS BI: CPT | Performed by: RADIOLOGY

## 2019-10-21 PROCEDURE — 77063 BREAST TOMOSYNTHESIS BI: CPT

## 2019-10-21 PROCEDURE — 77080 DXA BONE DENSITY AXIAL: CPT | Performed by: RADIOLOGY

## 2019-10-22 ENCOUNTER — HOSPITAL ENCOUNTER (OUTPATIENT)
Dept: CV DIAGNOSTICS | Age: 69
Discharge: HOME | End: 2019-10-22
Attending: NURSE PRACTITIONER

## 2019-10-22 ENCOUNTER — OFFICE VISIT (OUTPATIENT)
Dept: TRANSPLANT | Age: 69
End: 2019-10-22
Attending: INTERNAL MEDICINE

## 2019-10-22 ENCOUNTER — HOSPITAL ENCOUNTER (OUTPATIENT)
Dept: LAB | Age: 69
Discharge: HOME | End: 2019-10-22
Attending: INTERNAL MEDICINE

## 2019-10-22 VITALS
BODY MASS INDEX: 22.68 KG/M2 | WEIGHT: 124 LBS | RESPIRATION RATE: 16 BRPM | OXYGEN SATURATION: 100 % | DIASTOLIC BLOOD PRESSURE: 80 MMHG | SYSTOLIC BLOOD PRESSURE: 133 MMHG | HEART RATE: 98 BPM

## 2019-10-22 VITALS
OXYGEN SATURATION: 100 % | BODY MASS INDEX: 22.68 KG/M2 | WEIGHT: 124 LBS | RESPIRATION RATE: 16 BRPM | HEART RATE: 98 BPM | DIASTOLIC BLOOD PRESSURE: 80 MMHG | SYSTOLIC BLOOD PRESSURE: 133 MMHG

## 2019-10-22 VITALS
WEIGHT: 124 LBS | SYSTOLIC BLOOD PRESSURE: 133 MMHG | DIASTOLIC BLOOD PRESSURE: 80 MMHG | HEART RATE: 98 BPM | BODY MASS INDEX: 22.68 KG/M2 | RESPIRATION RATE: 16 BRPM | OXYGEN SATURATION: 100 %

## 2019-10-22 DIAGNOSIS — Z94.1 HEART TRANSPLANT, ORTHOTOPIC, STATUS (CMD): ICD-10-CM

## 2019-10-22 DIAGNOSIS — K13.79 SORE MOUTH: Primary | ICD-10-CM

## 2019-10-22 DIAGNOSIS — I10 ESSENTIAL (PRIMARY) HYPERTENSION: ICD-10-CM

## 2019-10-22 DIAGNOSIS — E55.9 VITAMIN D DEFICIENCY: ICD-10-CM

## 2019-10-22 DIAGNOSIS — S01.501A OPEN WOUND OF LIP, UNSPECIFIED OPEN WOUND TYPE, INITIAL ENCOUNTER: Primary | ICD-10-CM

## 2019-10-22 DIAGNOSIS — N18.30 CHRONIC KIDNEY DISEASE, STAGE III (MODERATE) (CMD): ICD-10-CM

## 2019-10-22 DIAGNOSIS — N18.30 ANEMIA IN STAGE 3 CHRONIC KIDNEY DISEASE (CMD): ICD-10-CM

## 2019-10-22 DIAGNOSIS — D63.1 ANEMIA IN STAGE 3 CHRONIC KIDNEY DISEASE (CMD): ICD-10-CM

## 2019-10-22 DIAGNOSIS — Z94.1 STATUS POST TRANSPLANT, HEART (CMD): ICD-10-CM

## 2019-10-22 DIAGNOSIS — R73.09 ELEVATED HEMOGLOBIN A1C: ICD-10-CM

## 2019-10-22 DIAGNOSIS — N18.30 CHRONIC KIDNEY DISEASE, STAGE III (MODERATE) (CMD): Primary | ICD-10-CM

## 2019-10-22 DIAGNOSIS — S01.501A OPEN WOUND OF LIP, UNSPECIFIED OPEN WOUND TYPE, INITIAL ENCOUNTER: ICD-10-CM

## 2019-10-22 DIAGNOSIS — M89.9 DISORDER OF BONE: ICD-10-CM

## 2019-10-22 DIAGNOSIS — N18.4 CKD (CHRONIC KIDNEY DISEASE), STAGE IV (CMD): ICD-10-CM

## 2019-10-22 DIAGNOSIS — E03.9 HYPOTHYROIDISM, UNSPECIFIED TYPE: ICD-10-CM

## 2019-10-22 LAB
25(OH)D3+25(OH)D2 SERPL-MCNC: 32.3 NG/ML (ref 30–100)
ALBUMIN SERPL-MCNC: 3.7 G/DL (ref 3.6–5.1)
ALBUMIN/GLOB SERPL: 0.8 {RATIO} (ref 1–2.4)
ALP SERPL-CCNC: 101 UNITS/L (ref 45–117)
ALT SERPL-CCNC: 12 UNITS/L
ANION GAP SERPL CALC-SCNC: 11 MMOL/L (ref 10–20)
AORTIC VALVE AREA: 2.4 CM2
APPEARANCE UR: CLEAR
ASCENDING AORTA (AAD): 2.8 CM
AST SERPL-CCNC: 11 UNITS/L
AV MEAN GRADIENT (AVMG): 3 MMHG
AV PEAK GRADIENT (AVPG): 6 MMHG
AV PEAK VELOCITY (AVPV): 1.2 M/S
BACTERIA #/AREA URNS HPF: NORMAL /HPF
BASOPHILS # BLD AUTO: 0 K/MCL (ref 0–0.3)
BASOPHILS NFR BLD AUTO: 1 %
BILIRUB SERPL-MCNC: 0.3 MG/DL (ref 0.2–1)
BILIRUB UR QL STRIP: NEGATIVE
BUN SERPL-MCNC: 40 MG/DL (ref 6–20)
BUN/CREAT SERPL: 23 (ref 7–25)
CALCIUM SERPL-MCNC: 9.5 MG/DL (ref 8.4–10.2)
CHLORIDE SERPL-SCNC: 109 MMOL/L (ref 98–107)
CHOLEST SERPL-MCNC: 315 MG/DL
CHOLEST/HDLC SERPL: 3.9 {RATIO}
CO2 SERPL-SCNC: 23 MMOL/L (ref 21–32)
COLOR UR: YELLOW
CREAT ?TM UR-MCNC: 103 MG/DL
CREAT SERPL-MCNC: 1.75 MG/DL (ref 0.51–0.95)
CYCLOSPORINE BLD-MCNC: 88 NG/ML (ref 100–400)
DIFFERENTIAL METHOD BLD: ABNORMAL
DOP CALC LVOT PEAK VEL (LVOTPV): 0.9 M/S
E WAVE DECELARATION TIME (MDT): 162 MS
EOSINOPHIL # BLD AUTO: 0.2 K/MCL (ref 0.1–0.5)
EOSINOPHIL NFR SPEC: 4 %
ERYTHROCYTE [DISTWIDTH] IN BLOOD: 14.2 % (ref 11–15)
EST RIGHT VENT SYSTOLIC PRESSURE BY TRICUSPID REGURGITATION JET (RVSP): 27.2 MMHG
GLOBULIN SER-MCNC: 4.5 G/DL (ref 2–4)
GLUCOSE SERPL-MCNC: 80 MG/DL (ref 65–99)
GLUCOSE UR STRIP-MCNC: NEGATIVE MG/DL
HBA1C MFR BLD: 5.8 % (ref 4.5–5.6)
HCT VFR BLD CALC: 34.7 % (ref 36–46.5)
HDLC SERPL-MCNC: 81 MG/DL
HGB BLD-MCNC: 9.9 G/DL (ref 12–15.5)
HGB UR QL STRIP: NEGATIVE
HYALINE CASTS #/AREA URNS LPF: NORMAL /LPF (ref 0–5)
IMM GRANULOCYTES # BLD AUTO: 0 K/MCL (ref 0–0.2)
IMM GRANULOCYTES NFR BLD: 0 %
INTERVENTRICULAR SEPTUM IN END DIASTOLE (IVSD): 1.3 CM
KETONES UR STRIP-MCNC: NEGATIVE MG/DL
LDLC SERPL-MCNC: 208 MG/DL
LEFT VENTRICULAR INTERNAL DIMENSION IN DIASTOLE (LVDD): 3.9 CM
LEFT VENTRICULAR POSTERIOR WALL IN END DIASTOLE (LVPW): 1 CM
LEUKOCYTE ESTERASE UR QL STRIP: NEGATIVE
LV EF: 63 %
LV END SYSTOLIC LONGITUDINAL STRAIN GLOBAL (LVGS): -14 %
LVOT 2D (LVOTD): 2 CM
LVOT VTI (LVOTVTI): 17.7 CM
LYMPHOCYTES # BLD MANUAL: 0.9 K/MCL (ref 1–4)
LYMPHOCYTES NFR BLD MANUAL: 22 %
MAGNESIUM SERPL-MCNC: 1.8 MG/DL (ref 1.7–2.4)
MCH RBC QN AUTO: 24.7 PG (ref 26–34)
MCHC RBC AUTO-ENTMCNC: 28.5 G/DL (ref 32–36.5)
MCV RBC AUTO: 86.5 FL (ref 78–100)
MONOCYTES # BLD MANUAL: 0.2 K/MCL (ref 0.3–0.9)
MONOCYTES NFR BLD MANUAL: 6 %
MV E TISSUE VEL LAT (MELV): 5.5 CM/S
MV E TISSUE VEL MED (MESV): 4 CM/S
MV E WAVE VEL/E TISSUE VEL MED(MSR): 16.4
MV PEAK A VELOCITY (MVPAV): 0.4 M/S
MV PEAK E VELOCITY (MVPEV): 0.7 M/S
NEUTROPHILS # BLD: 2.5 K/MCL (ref 1.8–7.7)
NEUTROPHILS NFR BLD AUTO: 67 %
NITRITE UR QL STRIP: NEGATIVE
NONHDLC SERPL-MCNC: 234 MG/DL
NRBC BLD MANUAL-RTO: 0 /100 WBC
PH UR STRIP: 5 UNITS (ref 5–7)
PLATELET # BLD: 224 K/MCL (ref 140–450)
POTASSIUM SERPL-SCNC: 4.2 MMOL/L (ref 3.4–5.1)
PROT SERPL-MCNC: 8.2 G/DL (ref 6.4–8.2)
PROT UR STRIP-MCNC: NEGATIVE MG/DL
PROT UR-MCNC: 24 MG/DL
PROT/CREAT UR: 233 MGPR/GCR
PV PEAK VELOCITY (PVPV): 1 M/S
RBC # BLD: 4.01 MIL/MCL (ref 4–5.2)
RBC #/AREA URNS HPF: NORMAL /HPF (ref 0–2)
REPORT TEXT: NORMAL
RV TISSUE DOPPLER FREE WALL HEART RATE (RVSTV): 0.1 M/S
SINUSES OF VALSALVA (SVD): 2.9 CM
SODIUM SERPL-SCNC: 139 MMOL/L (ref 135–145)
SP GR UR STRIP: 1.01 (ref 1–1.03)
SPECIMEN SOURCE: NORMAL
SQUAMOUS #/AREA URNS HPF: NORMAL /HPF (ref 0–5)
TRICUSPID VALVE PEAK REGURGITATION VELOCITY (TRPV): 2.5 M/S
TRIGL SERPL-MCNC: 130 MG/DL
TSH SERPL-ACNC: 3.36 MCUNITS/ML (ref 0.35–5)
TV ESTIMATED RIGHT ARTERIAL PRESSURE (RAP): 3 MMHG
URATE SERPL-MCNC: 6.5 MG/DL (ref 2.6–5.9)
UROBILINOGEN UR STRIP-MCNC: 0.2 MG/DL (ref 0–1)
WBC # BLD: 3.8 K/MCL (ref 4.2–11)
WBC #/AREA URNS HPF: NORMAL /HPF (ref 0–5)

## 2019-10-22 PROCEDURE — 36415 COLL VENOUS BLD VENIPUNCTURE: CPT

## 2019-10-22 PROCEDURE — 85025 COMPLETE CBC W/AUTO DIFF WBC: CPT

## 2019-10-22 PROCEDURE — 93351 STRESS TTE COMPLETE: CPT | Performed by: INTERNAL MEDICINE

## 2019-10-22 PROCEDURE — 83036 HEMOGLOBIN GLYCOSYLATED A1C: CPT

## 2019-10-22 PROCEDURE — 99212 OFFICE O/P EST SF 10 MIN: CPT

## 2019-10-22 PROCEDURE — 99213 OFFICE O/P EST LOW 20 MIN: CPT | Performed by: INTERNAL MEDICINE

## 2019-10-22 PROCEDURE — 83735 ASSAY OF MAGNESIUM: CPT

## 2019-10-22 PROCEDURE — 10004168 STRESS TEST: Performed by: INTERNAL MEDICINE

## 2019-10-22 PROCEDURE — 93306 TTE W/DOPPLER COMPLETE: CPT | Performed by: INTERNAL MEDICINE

## 2019-10-22 PROCEDURE — 81001 URINALYSIS AUTO W/SCOPE: CPT

## 2019-10-22 PROCEDURE — 82306 VITAMIN D 25 HYDROXY: CPT

## 2019-10-22 PROCEDURE — 80053 COMPREHEN METABOLIC PANEL: CPT

## 2019-10-22 PROCEDURE — 10004095 HB COUNTER, VISIT, NON TRANSPLANT

## 2019-10-22 PROCEDURE — 93306 TTE W/DOPPLER COMPLETE: CPT

## 2019-10-22 PROCEDURE — 80061 LIPID PANEL: CPT

## 2019-10-22 PROCEDURE — 93017 CV STRESS TEST TRACING ONLY: CPT

## 2019-10-22 PROCEDURE — 0399T MYOCARDIAL STRAIN IMAGING QUANTITATIVE ASSESSMENT OF MYOCARDIAL MECH: CPT | Performed by: INTERNAL MEDICINE

## 2019-10-22 PROCEDURE — 76376 3D RENDER W/INTRP POSTPROCES: CPT | Performed by: INTERNAL MEDICINE

## 2019-10-22 PROCEDURE — 84550 ASSAY OF BLOOD/URIC ACID: CPT

## 2019-10-22 PROCEDURE — 10004026 HB COUNTER RECIPIENT HEART PHASE 6

## 2019-10-22 PROCEDURE — 87449 NOS EACH ORGANISM AG IA: CPT

## 2019-10-22 PROCEDURE — 84156 ASSAY OF PROTEIN URINE: CPT

## 2019-10-22 PROCEDURE — 80158 DRUG ASSAY CYCLOSPORINE: CPT

## 2019-10-22 PROCEDURE — 99214 OFFICE O/P EST MOD 30 MIN: CPT | Performed by: INTERNAL MEDICINE

## 2019-10-22 PROCEDURE — 84443 ASSAY THYROID STIM HORMONE: CPT

## 2019-10-22 PROCEDURE — 93351 STRESS TTE COMPLETE: CPT

## 2019-10-22 PROCEDURE — 10002800 HB RX 250 W HCPCS: Performed by: INTERNAL MEDICINE

## 2019-10-22 RX ORDER — PRAVASTATIN SODIUM 20 MG
20 TABLET ORAL NIGHTLY
Status: ON HOLD | COMMUNITY
End: 2021-02-03 | Stop reason: SDUPTHER

## 2019-10-22 RX ORDER — PRAVASTATIN SODIUM 40 MG
20 TABLET ORAL DAILY
COMMUNITY
End: 2019-10-22 | Stop reason: DRUGHIGH

## 2019-10-22 RX ORDER — DOBUTAMINE HYDROCHLORIDE 200 MG/100ML
0-40 INJECTION INTRAVENOUS CONTINUOUS
Status: DISCONTINUED | OUTPATIENT
Start: 2019-10-22 | End: 2019-10-24 | Stop reason: HOSPADM

## 2019-10-22 RX ADMIN — DOBUTAMINE IN DEXTROSE 5 MCG/KG/MIN: 200 INJECTION, SOLUTION INTRAVENOUS at 09:36

## 2019-10-22 RX ADMIN — ATROPINE SULFATE 0.2 MG: 0.1 INJECTION PARENTERAL at 09:49

## 2019-10-22 SDOH — HEALTH STABILITY: MENTAL HEALTH: HOW OFTEN DO YOU HAVE A DRINK CONTAINING ALCOHOL?: NEVER

## 2019-10-22 SDOH — HEALTH STABILITY: MENTAL HEALTH: HOW MANY STANDARD DRINKS CONTAINING ALCOHOL DO YOU HAVE ON A TYPICAL DAY?: 1 OR 2

## 2019-10-22 SDOH — HEALTH STABILITY: MENTAL HEALTH: HOW OFTEN DO YOU HAVE 6 OR MORE DRINKS ON ONE OCCASION?: NEVER

## 2019-10-23 ENCOUNTER — TELEPHONE (OUTPATIENT)
Dept: TRANSPLANT | Age: 69
End: 2019-10-23

## 2019-10-23 LAB
CMV DNA # BLD NAA+PROBE: <150 IUNITS/ML
CMV DNA SERPL NAA+PROBE-LOG#: <2.18 IUNITS/ML
SPECIMEN SOURCE: ABNORMAL

## 2019-10-23 RX ORDER — CYCLOSPORINE 25 MG/1
CAPSULE ORAL
Qty: 540 CAPSULE | Refills: 3 | Status: SHIPPED | OUTPATIENT
Start: 2019-10-23 | End: 2020-11-30 | Stop reason: SDUPTHER

## 2019-10-26 LAB
B DERMAT AB SER QL: NORMAL NG/ML
IMMUNOLOGIST REVIEW: NEGATIVE
SPECIMEN SOURCE: NORMAL

## 2019-12-16 ENCOUNTER — TELEPHONE (OUTPATIENT)
Dept: ELECTROPHYSIOLOGY | Age: 69
End: 2019-12-16

## 2019-12-26 ENCOUNTER — REMOTE DEVICE CHECK (OUTPATIENT)
Dept: ELECTROPHYSIOLOGY | Age: 69
End: 2019-12-26
Attending: INTERNAL MEDICINE

## 2019-12-26 DIAGNOSIS — Z95.0 PACEMAKER: Primary | ICD-10-CM

## 2019-12-26 PROCEDURE — 93296 REM INTERROG EVL PM/IDS: CPT

## 2019-12-26 PROCEDURE — 93294 REM INTERROG EVL PM/LDLS PM: CPT | Performed by: INTERNAL MEDICINE

## 2020-01-20 RX ORDER — LEVOTHYROXINE SODIUM 0.07 MG/1
TABLET ORAL
Qty: 90 TABLET | Refills: 3 | Status: SHIPPED | OUTPATIENT
Start: 2020-01-20 | End: 2021-05-24 | Stop reason: SDUPTHER

## 2020-04-16 ENCOUNTER — TELEPHONE (OUTPATIENT)
Dept: TRANSPLANT | Age: 70
End: 2020-04-16

## 2020-04-16 RX ORDER — MYCOPHENOLATE MOFETIL 500 MG/1
500 TABLET ORAL 2 TIMES DAILY
Qty: 60 TABLET | Refills: 11 | Status: SHIPPED | OUTPATIENT
Start: 2020-04-16 | End: 2021-01-26 | Stop reason: SDUPTHER

## 2020-04-28 ENCOUNTER — CLINICAL ABSTRACT (OUTPATIENT)
Dept: TRANSPLANT | Age: 70
End: 2020-04-28

## 2020-05-11 ENCOUNTER — REMOTE DEVICE CHECK (OUTPATIENT)
Dept: ELECTROPHYSIOLOGY | Age: 70
End: 2020-05-11
Attending: INTERNAL MEDICINE

## 2020-05-11 DIAGNOSIS — I48.0 PAF (PAROXYSMAL ATRIAL FIBRILLATION) (CMD): ICD-10-CM

## 2020-05-11 DIAGNOSIS — Z95.0 PACEMAKER: Primary | ICD-10-CM

## 2020-05-11 PROCEDURE — 93296 REM INTERROG EVL PM/IDS: CPT

## 2020-05-11 PROCEDURE — 93294 REM INTERROG EVL PM/LDLS PM: CPT | Performed by: INTERNAL MEDICINE

## 2020-05-15 ENCOUNTER — ADVANCED DIRECTIVES (OUTPATIENT)
Dept: HEALTH INFORMATION MANAGEMENT | Age: 70
End: 2020-05-15

## 2020-09-29 ENCOUNTER — OFFICE VISIT (OUTPATIENT)
Dept: CARDIOLOGY | Age: 70
End: 2020-09-29
Attending: INTERNAL MEDICINE

## 2020-09-29 VITALS — OXYGEN SATURATION: 97 % | HEART RATE: 74 BPM | DIASTOLIC BLOOD PRESSURE: 72 MMHG | SYSTOLIC BLOOD PRESSURE: 132 MMHG

## 2020-09-29 DIAGNOSIS — I48.0 PAF (PAROXYSMAL ATRIAL FIBRILLATION) (CMD): Primary | ICD-10-CM

## 2020-09-29 DIAGNOSIS — I47.29 NONSUSTAINED VENTRICULAR TACHYCARDIA (CMD): ICD-10-CM

## 2020-09-29 DIAGNOSIS — I44.2 CHB (COMPLETE HEART BLOCK) (CMD): ICD-10-CM

## 2020-09-29 DIAGNOSIS — Z95.0 PACEMAKER: ICD-10-CM

## 2020-09-29 DIAGNOSIS — Z91.89 STROKE RISK: ICD-10-CM

## 2020-09-29 DIAGNOSIS — Z94.1 HEART TRANSPLANT, ORTHOTOPIC, STATUS (CMD): ICD-10-CM

## 2020-09-29 PROCEDURE — 93280 PM DEVICE PROGR EVAL DUAL: CPT | Performed by: INTERNAL MEDICINE

## 2020-09-29 PROCEDURE — 99213 OFFICE O/P EST LOW 20 MIN: CPT | Performed by: INTERNAL MEDICINE

## 2020-09-30 ENCOUNTER — TELEPHONE (OUTPATIENT)
Dept: CARDIOLOGY | Age: 70
End: 2020-09-30

## 2020-11-30 ENCOUNTER — CLINICAL ABSTRACT (OUTPATIENT)
Dept: TRANSPLANT | Age: 70
End: 2020-11-30

## 2020-11-30 RX ORDER — CYCLOSPORINE 25 MG/1
75 CAPSULE ORAL 2 TIMES DAILY
Qty: 540 CAPSULE | Refills: 0 | Status: SHIPPED | OUTPATIENT
Start: 2020-11-30 | End: 2021-03-23 | Stop reason: SDUPTHER

## 2020-11-30 RX ORDER — NIFEDIPINE 30 MG/1
TABLET, FILM COATED, EXTENDED RELEASE ORAL
Qty: 180 TABLET | Refills: 3 | Status: ON HOLD | OUTPATIENT
Start: 2020-11-30 | End: 2021-02-03 | Stop reason: SDUPTHER

## 2020-12-18 ENCOUNTER — TELEPHONE (OUTPATIENT)
Dept: TRANSPLANT | Age: 70
End: 2020-12-18

## 2021-01-06 ENCOUNTER — TELEPHONE (OUTPATIENT)
Dept: TRANSPLANT | Age: 71
End: 2021-01-06

## 2021-01-06 DIAGNOSIS — Z78.0 ASYMPTOMATIC MENOPAUSAL STATE: ICD-10-CM

## 2021-01-06 DIAGNOSIS — Z94.1 STATUS POST TRANSPLANT, HEART (CMD): Primary | ICD-10-CM

## 2021-01-06 DIAGNOSIS — Z12.31 ENCOUNTER FOR SCREENING MAMMOGRAM FOR MALIGNANT NEOPLASM OF BREAST: ICD-10-CM

## 2021-01-06 DIAGNOSIS — M89.9 DISORDER OF BONE: ICD-10-CM

## 2021-01-06 DIAGNOSIS — E55.9 VITAMIN D DEFICIENCY: ICD-10-CM

## 2021-01-07 RX ORDER — CLONIDINE HYDROCHLORIDE 0.1 MG/1
0.1 TABLET ORAL EVERY 4 HOURS PRN
Status: CANCELLED | OUTPATIENT
Start: 2021-01-07 | End: 2021-01-08

## 2021-01-07 RX ORDER — HYDRALAZINE HYDROCHLORIDE 20 MG/ML
10 INJECTION INTRAMUSCULAR; INTRAVENOUS EVERY 4 HOURS PRN
Status: CANCELLED | OUTPATIENT
Start: 2021-01-07 | End: 2021-01-08

## 2021-01-07 RX ORDER — SODIUM CHLORIDE 9 MG/ML
INJECTION, SOLUTION INTRAVENOUS CONTINUOUS
Status: CANCELLED | OUTPATIENT
Start: 2021-01-07

## 2021-01-07 RX ORDER — 0.9 % SODIUM CHLORIDE 0.9 %
2 VIAL (ML) INJECTION EVERY 12 HOURS SCHEDULED
Status: CANCELLED | OUTPATIENT
Start: 2021-01-07

## 2021-01-26 RX ORDER — MYCOPHENOLATE MOFETIL 500 MG/1
500 TABLET ORAL 2 TIMES DAILY
Qty: 180 TABLET | Refills: 3 | Status: SHIPPED | OUTPATIENT
Start: 2021-01-26 | End: 2021-09-13 | Stop reason: SDUPTHER

## 2021-01-28 SDOH — HEALTH STABILITY: MENTAL HEALTH: HOW OFTEN DO YOU HAVE A DRINK CONTAINING ALCOHOL?: NEVER

## 2021-01-28 SDOH — HEALTH STABILITY: MENTAL HEALTH: HOW OFTEN DO YOU HAVE 6 OR MORE DRINKS ON ONE OCCASION?: NEVER

## 2021-01-28 SDOH — HEALTH STABILITY: MENTAL HEALTH: HOW MANY STANDARD DRINKS CONTAINING ALCOHOL DO YOU HAVE ON A TYPICAL DAY?: 1 OR 2

## 2021-01-28 ASSESSMENT — ACTIVITIES OF DAILY LIVING (ADL)
NEEDS_ASSIST: NO
ADL_SCORE: 12
HISTORY OF FALLING IN THE LAST YEAR (PRIOR TO ADMISSION): NO
SENSORY_SUPPORT_DEVICES: DENTURES
RECENT_DECLINE_ADL: NO
CHRONIC_PAIN_PRESENT: NO
ADL_BEFORE_ADMISSION: INDEPENDENT
ADL_SHORT_OF_BREATH: NO

## 2021-01-28 ASSESSMENT — COGNITIVE AND FUNCTIONAL STATUS - GENERAL
ARE YOU DEAF OR DO YOU HAVE SERIOUS DIFFICULTY  HEARING: NO
ARE YOU BLIND OR DO YOU HAVE SERIOUS DIFFICULTY SEEING, EVEN WHEN WEARING GLASSES: NO

## 2021-01-30 ENCOUNTER — LAB SERVICES (OUTPATIENT)
Dept: LAB | Age: 71
End: 2021-01-30
Attending: INTERNAL MEDICINE

## 2021-01-30 ENCOUNTER — LAB SERVICES (OUTPATIENT)
Dept: TRANSPLANT | Age: 71
End: 2021-01-30

## 2021-01-30 DIAGNOSIS — Z94.1 HEART TRANSPLANT, ORTHOTOPIC, STATUS (CMD): ICD-10-CM

## 2021-01-30 DIAGNOSIS — Z94.1 HEART TRANSPLANT, ORTHOTOPIC, STATUS (CMD): Primary | ICD-10-CM

## 2021-01-30 PROCEDURE — U0003 INFECTIOUS AGENT DETECTION BY NUCLEIC ACID (DNA OR RNA); SEVERE ACUTE RESPIRATORY SYNDROME CORONAVIRUS 2 (SARS-COV-2) (CORONAVIRUS DISEASE [COVID-19]), AMPLIFIED PROBE TECHNIQUE, MAKING USE OF HIGH THROUGHPUT TECHNOLOGIES AS DESCRIBED BY CMS-2020-01-R: HCPCS

## 2021-01-31 LAB
SARS-COV-2 RNA RESP QL NAA+PROBE: NOT DETECTED
SERVICE CMNT-IMP: NORMAL
SERVICE CMNT-IMP: NORMAL

## 2021-02-02 ENCOUNTER — HOSPITAL ENCOUNTER (OUTPATIENT)
Age: 71
Discharge: HOME | End: 2021-02-03
Attending: INTERNAL MEDICINE | Admitting: INTERNAL MEDICINE

## 2021-02-02 ENCOUNTER — HOSPITAL ENCOUNTER (OUTPATIENT)
Dept: MAMMOGRAPHY | Age: 71
Discharge: HOME | End: 2021-02-02
Attending: INTERNAL MEDICINE

## 2021-02-02 ENCOUNTER — HOSPITAL ENCOUNTER (OUTPATIENT)
Dept: CV DIAGNOSTICS | Age: 71
Discharge: HOME | End: 2021-02-02
Attending: INTERNAL MEDICINE | Admitting: INTERNAL MEDICINE

## 2021-02-02 ENCOUNTER — HOSPITAL ENCOUNTER (OUTPATIENT)
Dept: NUCLEAR MEDICINE | Age: 71
Discharge: HOME | End: 2021-02-02
Attending: INTERNAL MEDICINE

## 2021-02-02 DIAGNOSIS — Z94.1 STATUS POST TRANSPLANT, HEART (CMD): ICD-10-CM

## 2021-02-02 DIAGNOSIS — Z13.6 ENCOUNTER FOR SCREENING FOR CARDIAC ALLOGRAFT VASCULOPATHY: ICD-10-CM

## 2021-02-02 DIAGNOSIS — Z78.0 ASYMPTOMATIC MENOPAUSAL STATE: ICD-10-CM

## 2021-02-02 DIAGNOSIS — M89.9 DISORDER OF BONE: ICD-10-CM

## 2021-02-02 DIAGNOSIS — E55.9 VITAMIN D DEFICIENCY: ICD-10-CM

## 2021-02-02 DIAGNOSIS — Z12.31 ENCOUNTER FOR SCREENING MAMMOGRAM FOR MALIGNANT NEOPLASM OF BREAST: ICD-10-CM

## 2021-02-02 LAB
25(OH)D3+25(OH)D2 SERPL-MCNC: 16.8 NG/ML (ref 30–100)
ALBUMIN SERPL-MCNC: 3.5 G/DL (ref 3.6–5.1)
ALBUMIN/GLOB SERPL: 0.9 {RATIO} (ref 1–2.4)
ALP SERPL-CCNC: 85 UNITS/L (ref 45–117)
ALT SERPL-CCNC: 14 UNITS/L
ANION GAP SERPL CALC-SCNC: 10 MMOL/L (ref 10–20)
AST SERPL-CCNC: 14 UNITS/L
ATRIAL RATE (BPM): 62
BASOPHILS # BLD: 0 K/MCL (ref 0–0.3)
BASOPHILS NFR BLD: 0 %
BILIRUB SERPL-MCNC: 0.2 MG/DL (ref 0.2–1)
BUN SERPL-MCNC: 32 MG/DL (ref 6–20)
BUN/CREAT SERPL: 20 (ref 7–25)
CALCIUM SERPL-MCNC: 8.9 MG/DL (ref 8.4–10.2)
CHLORIDE SERPL-SCNC: 111 MMOL/L (ref 98–107)
CHOLEST SERPL-MCNC: 328 MG/DL
CHOLEST/HDLC SERPL: 3.2 {RATIO}
CO2 SERPL-SCNC: 25 MMOL/L (ref 21–32)
CREAT SERPL-MCNC: 1.58 MG/DL (ref 0.51–0.95)
CYCLOSPORINE BLD-MCNC: 66 NG/ML (ref 100–400)
DEPRECATED RDW RBC: 47.2 FL (ref 39–50)
EOSINOPHIL # BLD: 0.1 K/MCL (ref 0–0.5)
EOSINOPHIL NFR BLD: 1 %
ERYTHROCYTE [DISTWIDTH] IN BLOOD: 14.8 % (ref 11–15)
FASTING DURATION TIME PATIENT: ABNORMAL H
FASTING DURATION TIME PATIENT: ABNORMAL H
GFR SERPLBLD BASED ON 1.73 SQ M-ARVRAT: 37 ML/MIN/1.73M2
GLOBULIN SER-MCNC: 4 G/DL (ref 2–4)
GLUCOSE BLDC GLUCOMTR-MCNC: 75 MG/DL (ref 70–99)
GLUCOSE SERPL-MCNC: 85 MG/DL (ref 65–99)
HBA1C MFR BLD: 5.9 % (ref 4.5–5.6)
HCT VFR BLD CALC: 33.1 % (ref 36–46.5)
HDLC SERPL-MCNC: 104 MG/DL
HGB BLD-MCNC: 9.4 G/DL (ref 12–15.5)
IMM GRANULOCYTES # BLD AUTO: 0 K/MCL (ref 0–0.2)
IMM GRANULOCYTES # BLD: 1 %
LDLC SERPL CALC-MCNC: 203 MG/DL
LYMPHOCYTES # BLD: 0.8 K/MCL (ref 1–4)
LYMPHOCYTES NFR BLD: 13 %
MAGNESIUM SERPL-MCNC: 1.9 MG/DL (ref 1.7–2.4)
MCH RBC QN AUTO: 24.8 PG (ref 26–34)
MCHC RBC AUTO-ENTMCNC: 28.4 G/DL (ref 32–36.5)
MCV RBC AUTO: 87.3 FL (ref 78–100)
MONOCYTES # BLD: 0.4 K/MCL (ref 0.3–0.9)
MONOCYTES NFR BLD: 6 %
NEUTROPHILS # BLD: 4.8 K/MCL (ref 1.8–7.7)
NEUTROPHILS NFR BLD: 79 %
NONHDLC SERPL-MCNC: 224 MG/DL
NRBC BLD MANUAL-RTO: 0 /100 WBC
PLATELET # BLD AUTO: 211 K/MCL (ref 140–450)
POTASSIUM SERPL-SCNC: 4.3 MMOL/L (ref 3.4–5.1)
PR-INTERVAL (MSEC): 200
PROT SERPL-MCNC: 7.5 G/DL (ref 6.4–8.2)
QRS-INTERVAL (MSEC): 144
QT-INTERVAL (MSEC): 438
QTC: 445
R AXIS (DEGREES): 50
RAINBOW EXTRA TUBES HOLD SPECIMEN: NORMAL
RBC # BLD: 3.79 MIL/MCL (ref 4–5.2)
REPORT TEXT: NORMAL
SODIUM SERPL-SCNC: 142 MMOL/L (ref 135–145)
T AXIS (DEGREES): 49
TRIGL SERPL-MCNC: 103 MG/DL
TSH SERPL-ACNC: 1.39 MCUNITS/ML (ref 0.35–5)
URATE SERPL-MCNC: 7.4 MG/DL (ref 2.6–5.9)
VENTRICULAR RATE EKG/MIN (BPM): 62
WBC # BLD: 6.1 K/MCL (ref 4.2–11)

## 2021-02-02 PROCEDURE — 77067 SCR MAMMO BI INCL CAD: CPT | Performed by: RADIOLOGY

## 2021-02-02 PROCEDURE — 93454 CORONARY ARTERY ANGIO S&I: CPT | Performed by: INTERNAL MEDICINE

## 2021-02-02 PROCEDURE — 10002805 HB CONTRAST AGENT: Performed by: INTERNAL MEDICINE

## 2021-02-02 PROCEDURE — 10004160 HB COUNTER-PRE PROC ENCOUNTER: Performed by: INTERNAL MEDICINE

## 2021-02-02 PROCEDURE — 83735 ASSAY OF MAGNESIUM: CPT | Performed by: INTERNAL MEDICINE

## 2021-02-02 PROCEDURE — 10002807 HB RX 258: Performed by: INTERNAL MEDICINE

## 2021-02-02 PROCEDURE — 93306 TTE W/DOPPLER COMPLETE: CPT

## 2021-02-02 PROCEDURE — 10002767 HB EXTENDED RECOVERY PER HOUR: Performed by: INTERNAL MEDICINE

## 2021-02-02 PROCEDURE — 10004281 HB COUNTER-STAFF TIME PER 15 MIN

## 2021-02-02 PROCEDURE — 84443 ASSAY THYROID STIM HORMONE: CPT | Performed by: INTERNAL MEDICINE

## 2021-02-02 PROCEDURE — 10004370 HB CARDIAC CATH: Performed by: INTERNAL MEDICINE

## 2021-02-02 PROCEDURE — C1887 CATHETER, GUIDING: HCPCS | Performed by: INTERNAL MEDICINE

## 2021-02-02 PROCEDURE — 80158 DRUG ASSAY CYCLOSPORINE: CPT | Performed by: INTERNAL MEDICINE

## 2021-02-02 PROCEDURE — 80053 COMPREHEN METABOLIC PANEL: CPT | Performed by: INTERNAL MEDICINE

## 2021-02-02 PROCEDURE — 77063 BREAST TOMOSYNTHESIS BI: CPT

## 2021-02-02 PROCEDURE — 80061 LIPID PANEL: CPT | Performed by: INTERNAL MEDICINE

## 2021-02-02 PROCEDURE — 85025 COMPLETE CBC W/AUTO DIFF WBC: CPT | Performed by: INTERNAL MEDICINE

## 2021-02-02 PROCEDURE — 10004351 HB COUNTER-CATH LAB CASE: Performed by: INTERNAL MEDICINE

## 2021-02-02 PROCEDURE — 77080 DXA BONE DENSITY AXIAL: CPT

## 2021-02-02 PROCEDURE — 10004651 HB RX, NO CHARGE ITEM: Performed by: INTERNAL MEDICINE

## 2021-02-02 PROCEDURE — 10004185 HB COUNTER-VISIT  CENSUS: Performed by: INTERNAL MEDICINE

## 2021-02-02 PROCEDURE — 10004402 HB ULTRASOUND S&I: Performed by: INTERNAL MEDICINE

## 2021-02-02 PROCEDURE — 82306 VITAMIN D 25 HYDROXY: CPT | Performed by: INTERNAL MEDICINE

## 2021-02-02 PROCEDURE — 10004167 HB COUNTER-SHEATH REMOVAL: Performed by: INTERNAL MEDICINE

## 2021-02-02 PROCEDURE — 93010 ELECTROCARDIOGRAM REPORT: CPT | Performed by: INTERNAL MEDICINE

## 2021-02-02 PROCEDURE — 83036 HEMOGLOBIN GLYCOSYLATED A1C: CPT | Performed by: INTERNAL MEDICINE

## 2021-02-02 PROCEDURE — C1769 GUIDE WIRE: HCPCS | Performed by: INTERNAL MEDICINE

## 2021-02-02 PROCEDURE — 93356 MYOCRD STRAIN IMG SPCKL TRCK: CPT | Performed by: INTERNAL MEDICINE

## 2021-02-02 PROCEDURE — 10002803 HB RX 637: Performed by: INTERNAL MEDICINE

## 2021-02-02 PROCEDURE — 84550 ASSAY OF BLOOD/URIC ACID: CPT | Performed by: INTERNAL MEDICINE

## 2021-02-02 PROCEDURE — 82962 GLUCOSE BLOOD TEST: CPT

## 2021-02-02 PROCEDURE — 77080 DXA BONE DENSITY AXIAL: CPT | Performed by: RADIOLOGY

## 2021-02-02 PROCEDURE — 10002800 HB RX 250 W HCPCS: Performed by: INTERNAL MEDICINE

## 2021-02-02 PROCEDURE — 10006023 HB SUPPLY 272: Performed by: INTERNAL MEDICINE

## 2021-02-02 PROCEDURE — 93005 ELECTROCARDIOGRAM TRACING: CPT | Performed by: INTERNAL MEDICINE

## 2021-02-02 PROCEDURE — 10002801 HB RX 250 W/O HCPCS: Performed by: INTERNAL MEDICINE

## 2021-02-02 PROCEDURE — 99152 MOD SED SAME PHYS/QHP 5/>YRS: CPT | Performed by: INTERNAL MEDICINE

## 2021-02-02 PROCEDURE — C1894 INTRO/SHEATH, NON-LASER: HCPCS | Performed by: INTERNAL MEDICINE

## 2021-02-02 PROCEDURE — 36415 COLL VENOUS BLD VENIPUNCTURE: CPT | Performed by: INTERNAL MEDICINE

## 2021-02-02 PROCEDURE — 77063 BREAST TOMOSYNTHESIS BI: CPT | Performed by: RADIOLOGY

## 2021-02-02 PROCEDURE — 76376 3D RENDER W/INTRP POSTPROCES: CPT | Performed by: INTERNAL MEDICINE

## 2021-02-02 PROCEDURE — 93306 TTE W/DOPPLER COMPLETE: CPT | Performed by: INTERNAL MEDICINE

## 2021-02-02 PROCEDURE — 10004158 HB COUNTER-POST PROC I/P RETURN: Performed by: INTERNAL MEDICINE

## 2021-02-02 PROCEDURE — 10004159 HB COUNTER-POST PROCEDURE-DISCHARGE: Performed by: INTERNAL MEDICINE

## 2021-02-02 RX ORDER — ACETAMINOPHEN 325 MG/1
650 TABLET ORAL EVERY 6 HOURS PRN
Status: DISCONTINUED | OUTPATIENT
Start: 2021-02-02 | End: 2021-02-03 | Stop reason: HOSPADM

## 2021-02-02 RX ORDER — HYDRALAZINE HYDROCHLORIDE 20 MG/ML
10 INJECTION INTRAMUSCULAR; INTRAVENOUS EVERY 4 HOURS PRN
Status: DISCONTINUED | OUTPATIENT
Start: 2021-02-02 | End: 2021-02-02

## 2021-02-02 RX ORDER — HYDRALAZINE HYDROCHLORIDE 50 MG/1
100 TABLET, FILM COATED ORAL EVERY 8 HOURS SCHEDULED
Status: DISCONTINUED | OUTPATIENT
Start: 2021-02-02 | End: 2021-02-03 | Stop reason: HOSPADM

## 2021-02-02 RX ORDER — CYCLOSPORINE 25 MG/1
75 CAPSULE, LIQUID FILLED ORAL
Status: DISCONTINUED | OUTPATIENT
Start: 2021-02-02 | End: 2021-02-03 | Stop reason: HOSPADM

## 2021-02-02 RX ORDER — NIFEDIPINE 30 MG/1
30 TABLET, EXTENDED RELEASE ORAL EVERY 12 HOURS
Status: DISCONTINUED | OUTPATIENT
Start: 2021-02-03 | End: 2021-02-03

## 2021-02-02 RX ORDER — LIDOCAINE HYDROCHLORIDE 10 MG/ML
1 INJECTION, SOLUTION INFILTRATION; PERINEURAL PRN
Status: DISCONTINUED | OUTPATIENT
Start: 2021-02-02 | End: 2021-02-02

## 2021-02-02 RX ORDER — 0.9 % SODIUM CHLORIDE 0.9 %
2 VIAL (ML) INJECTION EVERY 12 HOURS SCHEDULED
Status: DISCONTINUED | OUTPATIENT
Start: 2021-02-02 | End: 2021-02-02 | Stop reason: HOSPADM

## 2021-02-02 RX ORDER — SODIUM CHLORIDE 9 MG/ML
INJECTION, SOLUTION INTRAVENOUS CONTINUOUS
Status: DISCONTINUED | OUTPATIENT
Start: 2021-02-02 | End: 2021-02-02

## 2021-02-02 RX ORDER — MIDAZOLAM HYDROCHLORIDE 1 MG/ML
INJECTION, SOLUTION INTRAMUSCULAR; INTRAVENOUS PRN
Status: DISCONTINUED | OUTPATIENT
Start: 2021-02-02 | End: 2021-02-02 | Stop reason: HOSPADM

## 2021-02-02 RX ORDER — MYCOPHENOLATE MOFETIL 500 MG/1
500 TABLET ORAL
Status: DISCONTINUED | OUTPATIENT
Start: 2021-02-02 | End: 2021-02-03 | Stop reason: HOSPADM

## 2021-02-02 RX ORDER — MIDAZOLAM HYDROCHLORIDE 1 MG/ML
1 INJECTION, SOLUTION INTRAMUSCULAR; INTRAVENOUS PRN
Status: DISCONTINUED | OUTPATIENT
Start: 2021-02-02 | End: 2021-02-02

## 2021-02-02 RX ORDER — PRAVASTATIN SODIUM 40 MG
20 TABLET ORAL NIGHTLY
Status: DISCONTINUED | OUTPATIENT
Start: 2021-02-02 | End: 2021-02-03

## 2021-02-02 RX ORDER — NITROGLYCERIN 0.4 MG/1
0.4 TABLET SUBLINGUAL EVERY 5 MIN PRN
Status: DISCONTINUED | OUTPATIENT
Start: 2021-02-02 | End: 2021-02-02

## 2021-02-02 RX ORDER — NIFEDIPINE 30 MG/1
30 TABLET, EXTENDED RELEASE ORAL NIGHTLY
Status: DISCONTINUED | OUTPATIENT
Start: 2021-02-02 | End: 2021-02-02 | Stop reason: DRUGHIGH

## 2021-02-02 RX ORDER — HYDRALAZINE HYDROCHLORIDE 20 MG/ML
INJECTION INTRAMUSCULAR; INTRAVENOUS PRN
Status: DISCONTINUED | OUTPATIENT
Start: 2021-02-02 | End: 2021-02-02 | Stop reason: HOSPADM

## 2021-02-02 RX ORDER — CLONIDINE HYDROCHLORIDE 0.1 MG/1
0.1 TABLET ORAL EVERY 4 HOURS PRN
Status: DISCONTINUED | OUTPATIENT
Start: 2021-02-02 | End: 2021-02-02

## 2021-02-02 RX ORDER — LEVOTHYROXINE SODIUM 0.07 MG/1
75 TABLET ORAL DAILY
Status: DISCONTINUED | OUTPATIENT
Start: 2021-02-03 | End: 2021-02-03 | Stop reason: HOSPADM

## 2021-02-02 RX ORDER — HEPARIN SODIUM 200 [USP'U]/100ML
INJECTION, SOLUTION INTRAVENOUS PRN
Status: DISCONTINUED | OUTPATIENT
Start: 2021-02-02 | End: 2021-02-02 | Stop reason: HOSPADM

## 2021-02-02 RX ORDER — PREDNISONE 5 MG/1
2.5 TABLET ORAL
Status: DISCONTINUED | OUTPATIENT
Start: 2021-02-03 | End: 2021-02-03 | Stop reason: HOSPADM

## 2021-02-02 RX ORDER — HYDRALAZINE HYDROCHLORIDE 50 MG/1
100 TABLET, FILM COATED ORAL EVERY 8 HOURS SCHEDULED
Status: DISCONTINUED | OUTPATIENT
Start: 2021-02-02 | End: 2021-02-02

## 2021-02-02 RX ADMIN — SODIUM CHLORIDE: 9 INJECTION, SOLUTION INTRAVENOUS at 09:50

## 2021-02-02 RX ADMIN — NIFEDIPINE 30 MG: 30 TABLET, FILM COATED, EXTENDED RELEASE ORAL at 16:07

## 2021-02-02 RX ADMIN — HYDRALAZINE HYDROCHLORIDE 10 MG: 20 INJECTION, SOLUTION INTRAMUSCULAR; INTRAVENOUS at 17:55

## 2021-02-02 RX ADMIN — ACETAMINOPHEN 650 MG: 325 TABLET ORAL at 19:20

## 2021-02-02 RX ADMIN — PRAVASTATIN SODIUM 20 MG: 40 TABLET ORAL at 22:11

## 2021-02-02 RX ADMIN — SODIUM CHLORIDE: 9 INJECTION, SOLUTION INTRAVENOUS at 10:24

## 2021-02-02 RX ADMIN — CYCLOSPORINE 75 MG: 25 CAPSULE, LIQUID FILLED ORAL at 22:10

## 2021-02-02 RX ADMIN — CLONIDINE HYDROCHLORIDE 0.1 MG: 0.1 TABLET ORAL at 09:46

## 2021-02-02 RX ADMIN — HYDRALAZINE HYDROCHLORIDE 100 MG: 50 TABLET, FILM COATED ORAL at 19:21

## 2021-02-02 RX ADMIN — MYCOPHENOLATE MOFETIL 500 MG: 500 TABLET, FILM COATED ORAL at 22:10

## 2021-02-02 ASSESSMENT — PAIN SCALES - GENERAL
PAINLEVEL_OUTOF10: 0
PAINLEVEL_OUTOF10: 5
PAINLEVEL_OUTOF10: 0
PAINLEVEL_OUTOF10: 0
PAINLEVEL_OUTOF10: 5
PAINLEVEL_OUTOF10: 0
PAINLEVEL_OUTOF10: 5
PAINLEVEL_OUTOF10: 0
PAINLEVEL_OUTOF10: 2
PAINLEVEL_OUTOF10: 0
PAINLEVEL_OUTOF10: 0

## 2021-02-02 ASSESSMENT — LIFESTYLE VARIABLES: SMOKING_HISTORY: YES

## 2021-02-03 ENCOUNTER — APPOINTMENT (OUTPATIENT)
Dept: TRANSPLANT | Age: 71
End: 2021-02-03
Attending: INTERNAL MEDICINE

## 2021-02-03 VITALS
HEART RATE: 70 BPM | SYSTOLIC BLOOD PRESSURE: 149 MMHG | HEIGHT: 62 IN | RESPIRATION RATE: 18 BRPM | TEMPERATURE: 97.8 F | BODY MASS INDEX: 25.96 KG/M2 | WEIGHT: 141.09 LBS | OXYGEN SATURATION: 98 % | DIASTOLIC BLOOD PRESSURE: 70 MMHG

## 2021-02-03 DIAGNOSIS — Z94.1 STATUS POST TRANSPLANT, HEART (CMD): Primary | ICD-10-CM

## 2021-02-03 DIAGNOSIS — I16.0 HYPERTENSIVE URGENCY: ICD-10-CM

## 2021-02-03 LAB
ALBUMIN SERPL-MCNC: 3.4 G/DL (ref 3.6–5.1)
ALBUMIN/GLOB SERPL: 0.8 {RATIO} (ref 1–2.4)
ALP SERPL-CCNC: 85 UNITS/L (ref 45–117)
ALT SERPL-CCNC: 14 UNITS/L
ANION GAP SERPL CALC-SCNC: 9 MMOL/L (ref 10–20)
AORTIC VALVE AREA: 2.15 CMÂ²
ASCENDING AORTA (AAD): 3.02 CM
AST SERPL-CCNC: 11 UNITS/L
AV MEAN GRADIENT (AVMG): 4.79 MMHG
AV PEAK GRADIENT (AVPG): 9.97 MMHG
AV PEAK VELOCITY (AVPV): 1.58 M/S
AVI LVOT PEAK GRADIENT (LVOTMG): 2.92 MMHG
BILIRUB SERPL-MCNC: 0.3 MG/DL (ref 0.2–1)
BUN SERPL-MCNC: 27 MG/DL (ref 6–20)
BUN/CREAT SERPL: 17 (ref 7–25)
CALCIUM SERPL-MCNC: 9.2 MG/DL (ref 8.4–10.2)
CHLORIDE SERPL-SCNC: 112 MMOL/L (ref 98–107)
CHOLEST SERPL-MCNC: 348 MG/DL
CHOLEST/HDLC SERPL: 3.6 {RATIO}
CMV DNA SERPL NAA+PROBE-ACNC: <150 IU/ML
CMV DNA SERPL NAA+PROBE-LOG IU: <2.18 {LOG_IU}/ML
CO2 SERPL-SCNC: 27 MMOL/L (ref 21–32)
CREAT SERPL-MCNC: 1.58 MG/DL (ref 0.51–0.95)
CYCLOSPORINE BLD-MCNC: 80 NG/ML (ref 100–400)
DEPRECATED RDW RBC: 46.4 FL (ref 39–50)
DOP CALC LVOT PEAK VEL (LVOTPV): 1.25 M/S
E WAVE DECELARATION TIME (MDT): 0.17 S
ERYTHROCYTE [DISTWIDTH] IN BLOOD: 15.1 % (ref 11–15)
EST RIGHT VENT SYSTOLIC PRESSURE BY TRICUSPID REGURGITATION JET (RVSP): 32.54 MMHG
FASTING DURATION TIME PATIENT: ABNORMAL H
FASTING DURATION TIME PATIENT: ABNORMAL H
GFR SERPLBLD BASED ON 1.73 SQ M-ARVRAT: 37 ML/MIN/1.73M2
GLOBULIN SER-MCNC: 4.2 G/DL (ref 2–4)
GLUCOSE SERPL-MCNC: 90 MG/DL (ref 65–99)
HCT VFR BLD CALC: 36.3 % (ref 36–46.5)
HDLC SERPL-MCNC: 98 MG/DL
HGB BLD-MCNC: 10.5 G/DL (ref 12–15.5)
INTERVENTRICULAR SEPTUM IN END DIASTOLE (IVSD): 1.37 CM
LDLC SERPL CALC-MCNC: 228 MG/DL
LEFT VENTRICULAR INTERNAL DIMENSION IN DIASTOLE (LVDD): 4.4 CM
LEFT VENTRICULAR POSTERIOR WALL IN END DIASTOLE (LVPW): 0.8 CM
LV EF: 72 %
LV END SYSTOLIC LONGITUDINAL STRAIN GLOBAL (LVGS): -22 %
LVOT 2D (LVOTD): 1.95 CM
LVOT VTI (LVOTVTI): 26.66 CM
MAGNESIUM SERPL-MCNC: 1.8 MG/DL (ref 1.7–2.4)
MCH RBC QN AUTO: 24.7 PG (ref 26–34)
MCHC RBC AUTO-ENTMCNC: 28.9 G/DL (ref 32–36.5)
MCV RBC AUTO: 85.4 FL (ref 78–100)
MV E TISSUE VEL LAT (MELV): 0.08 M/S
MV E TISSUE VEL MED (MESV): 0.07 M/S
MV E WAVE VEL/E TISSUE VEL MED(MSR): 11.91 UNITLESS
MV PEAK A VELOCITY (MVPAV): 0.59 M/S
MV PEAK E VELOCITY (MVPEV): 0.87 M/S
NONHDLC SERPL-MCNC: 250 MG/DL
NRBC BLD MANUAL-RTO: 0 /100 WBC
PLATELET # BLD AUTO: 220 K/MCL (ref 140–450)
POTASSIUM SERPL-SCNC: 4.6 MMOL/L (ref 3.4–5.1)
PROT SERPL-MCNC: 7.6 G/DL (ref 6.4–8.2)
PV PEAK VELOCITY (PVPV): 1.19 M/S
RBC # BLD: 4.25 MIL/MCL (ref 4–5.2)
RV END SYSTOLIC LONGITUDINAL STRAIN FREE WALL (RVGS): -23 %
SERVICE CMNT-IMP: ABNORMAL
SODIUM SERPL-SCNC: 143 MMOL/L (ref 135–145)
TRICUSPID ANNULAR PLANE SYSTOLIC EXCURSION (TAPSE): 1.73 CM
TRICUSPID VALVE ANNULAR PEAK VELOCITY (TVAPV): 0.08 M/S
TRICUSPID VALVE PEAK REGURGITATION VELOCITY (TRPV): 2.72 M/S
TRIGL SERPL-MCNC: 111 MG/DL
TV ESTIMATED RIGHT ARTERIAL PRESSURE (RAP): 3 MMHG
WBC # BLD: 7 K/MCL (ref 4.2–11)

## 2021-02-03 PROCEDURE — 83735 ASSAY OF MAGNESIUM: CPT | Performed by: INTERNAL MEDICINE

## 2021-02-03 PROCEDURE — 99225 SUBSEQUENT OBSERVATION CARE LEVEL II: CPT | Performed by: INTERNAL MEDICINE

## 2021-02-03 PROCEDURE — 10002803 HB RX 637: Performed by: INTERNAL MEDICINE

## 2021-02-03 PROCEDURE — 80061 LIPID PANEL: CPT | Performed by: INTERNAL MEDICINE

## 2021-02-03 PROCEDURE — 10004651 HB RX, NO CHARGE ITEM: Performed by: INTERNAL MEDICINE

## 2021-02-03 PROCEDURE — 10002803 HB RX 637: Performed by: REGISTERED NURSE

## 2021-02-03 PROCEDURE — 80158 DRUG ASSAY CYCLOSPORINE: CPT | Performed by: INTERNAL MEDICINE

## 2021-02-03 PROCEDURE — 85027 COMPLETE CBC AUTOMATED: CPT | Performed by: INTERNAL MEDICINE

## 2021-02-03 PROCEDURE — 80053 COMPREHEN METABOLIC PANEL: CPT | Performed by: INTERNAL MEDICINE

## 2021-02-03 RX ORDER — PRAVASTATIN SODIUM 40 MG
40 TABLET ORAL NIGHTLY
Qty: 90 TABLET | Refills: 3 | Status: SHIPPED | OUTPATIENT
Start: 2021-02-03

## 2021-02-03 RX ORDER — PRAVASTATIN SODIUM 40 MG
40 TABLET ORAL NIGHTLY
Status: DISCONTINUED | OUTPATIENT
Start: 2021-02-03 | End: 2021-02-03 | Stop reason: HOSPADM

## 2021-02-03 RX ORDER — FAMOTIDINE 20 MG/1
20 TABLET, FILM COATED ORAL DAILY
Status: DISCONTINUED | OUTPATIENT
Start: 2021-02-03 | End: 2021-02-03 | Stop reason: HOSPADM

## 2021-02-03 RX ORDER — NIFEDIPINE 60 MG/1
60 TABLET, FILM COATED, EXTENDED RELEASE ORAL DAILY
Qty: 90 TABLET | Refills: 0 | Status: SHIPPED | OUTPATIENT
Start: 2021-02-04 | End: 2021-09-13 | Stop reason: SDUPTHER

## 2021-02-03 RX ORDER — NIFEDIPINE 60 MG/1
60 TABLET, EXTENDED RELEASE ORAL DAILY
Status: DISCONTINUED | OUTPATIENT
Start: 2021-02-04 | End: 2021-02-03 | Stop reason: HOSPADM

## 2021-02-03 RX ORDER — NIFEDIPINE 30 MG/1
30 TABLET, EXTENDED RELEASE ORAL ONCE
Status: COMPLETED | OUTPATIENT
Start: 2021-02-03 | End: 2021-02-03

## 2021-02-03 RX ORDER — FAMOTIDINE 20 MG/1
20 TABLET, FILM COATED ORAL 2 TIMES DAILY
Qty: 180 TABLET | Refills: 3 | Status: SHIPPED | OUTPATIENT
Start: 2021-02-03 | End: 2021-02-05 | Stop reason: CLARIF

## 2021-02-03 RX ADMIN — PREDNISONE 2.5 MG: 5 TABLET ORAL at 08:58

## 2021-02-03 RX ADMIN — CALCIUM CARBONATE 600 MG (1,500 MG)-VITAMIN D3 400 UNIT TABLET 1 TABLET: at 13:46

## 2021-02-03 RX ADMIN — ACETAMINOPHEN 650 MG: 325 TABLET ORAL at 05:07

## 2021-02-03 RX ADMIN — NIFEDIPINE 30 MG: 30 TABLET, FILM COATED, EXTENDED RELEASE ORAL at 08:12

## 2021-02-03 RX ADMIN — LEVOTHYROXINE SODIUM 75 MCG: 75 TABLET ORAL at 08:12

## 2021-02-03 RX ADMIN — MYCOPHENOLATE MOFETIL 500 MG: 500 TABLET, FILM COATED ORAL at 08:59

## 2021-02-03 RX ADMIN — CYCLOSPORINE 50 MG: 25 CAPSULE, LIQUID FILLED ORAL at 08:59

## 2021-02-03 RX ADMIN — NIFEDIPINE 30 MG: 30 TABLET, FILM COATED, EXTENDED RELEASE ORAL at 13:46

## 2021-02-03 RX ADMIN — HYDRALAZINE HYDROCHLORIDE 100 MG: 50 TABLET, FILM COATED ORAL at 05:07

## 2021-02-03 ASSESSMENT — PAIN SCALES - GENERAL
PAINLEVEL_OUTOF10: 0
PAINLEVEL_OUTOF10: 4

## 2021-02-04 ASSESSMENT — NEW YORK HEART ASSOCIATION (NYHA) CLASSIFICATION
NYHA FUNCTIONAL CLASS: I
NYHA FUNCTIONAL CLASS: I

## 2021-02-05 ENCOUNTER — HOSPITAL ENCOUNTER (OUTPATIENT)
Dept: CT IMAGING | Age: 71
Discharge: HOME | End: 2021-02-05
Attending: NURSE PRACTITIONER

## 2021-02-05 ENCOUNTER — TELEPHONE (OUTPATIENT)
Dept: TRANSPLANT | Age: 71
End: 2021-02-05

## 2021-02-05 ENCOUNTER — HOSPITAL ENCOUNTER (OUTPATIENT)
Dept: ULTRASOUND IMAGING | Age: 71
Discharge: HOME | End: 2021-02-05
Attending: INTERNAL MEDICINE

## 2021-02-05 ENCOUNTER — HOSPITAL ENCOUNTER (OUTPATIENT)
Dept: LAB | Age: 71
Discharge: HOME | End: 2021-02-05
Attending: NURSE PRACTITIONER

## 2021-02-05 ENCOUNTER — OFFICE VISIT (OUTPATIENT)
Dept: TRANSPLANT | Age: 71
End: 2021-02-05
Attending: NURSE PRACTITIONER

## 2021-02-05 VITALS
WEIGHT: 141 LBS | SYSTOLIC BLOOD PRESSURE: 164 MMHG | BODY MASS INDEX: 25.79 KG/M2 | DIASTOLIC BLOOD PRESSURE: 75 MMHG | HEART RATE: 74 BPM | OXYGEN SATURATION: 99 %

## 2021-02-05 DIAGNOSIS — I16.0 HYPERTENSIVE URGENCY: ICD-10-CM

## 2021-02-05 DIAGNOSIS — E55.9 VITAMIN D DEFICIENCY: ICD-10-CM

## 2021-02-05 DIAGNOSIS — I10 ESSENTIAL (PRIMARY) HYPERTENSION: Primary | ICD-10-CM

## 2021-02-05 DIAGNOSIS — I10 ESSENTIAL (PRIMARY) HYPERTENSION: ICD-10-CM

## 2021-02-05 DIAGNOSIS — D84.9 IMMUNOSUPPRESSION (CMD): ICD-10-CM

## 2021-02-05 DIAGNOSIS — Z94.1 STATUS POST TRANSPLANT, HEART (CMD): ICD-10-CM

## 2021-02-05 DIAGNOSIS — N18.32 STAGE 3B CHRONIC KIDNEY DISEASE (CMD): ICD-10-CM

## 2021-02-05 LAB
ANION GAP SERPL CALC-SCNC: 11 MMOL/L (ref 10–20)
BASOPHILS # BLD: 0 K/MCL (ref 0–0.3)
BASOPHILS NFR BLD: 0 %
BUN SERPL-MCNC: 33 MG/DL (ref 6–20)
BUN/CREAT SERPL: 19 (ref 7–25)
CALCIUM SERPL-MCNC: 9.3 MG/DL (ref 8.4–10.2)
CHLORIDE SERPL-SCNC: 106 MMOL/L (ref 98–107)
CO2 SERPL-SCNC: 25 MMOL/L (ref 21–32)
CREAT SERPL-MCNC: 1.76 MG/DL (ref 0.51–0.95)
CYCLOSPORINE BLD-MCNC: 66 NG/ML (ref 100–400)
DEPRECATED RDW RBC: 46.2 FL (ref 39–50)
EOSINOPHIL # BLD: 0.1 K/MCL (ref 0–0.5)
EOSINOPHIL NFR BLD: 2 %
ERYTHROCYTE [DISTWIDTH] IN BLOOD: 14.8 % (ref 11–15)
FASTING DURATION TIME PATIENT: ABNORMAL H
GFR SERPLBLD BASED ON 1.73 SQ M-ARVRAT: 33 ML/MIN/1.73M2
GLUCOSE SERPL-MCNC: 86 MG/DL (ref 65–99)
HCT VFR BLD CALC: 35.1 % (ref 36–46.5)
HGB BLD-MCNC: 10.2 G/DL (ref 12–15.5)
IMM GRANULOCYTES # BLD AUTO: 0 K/MCL (ref 0–0.2)
IMM GRANULOCYTES # BLD: 0 %
LYMPHOCYTES # BLD: 0.7 K/MCL (ref 1–4)
LYMPHOCYTES NFR BLD: 10 %
MAGNESIUM SERPL-MCNC: 2 MG/DL (ref 1.7–2.4)
MCH RBC QN AUTO: 24.9 PG (ref 26–34)
MCHC RBC AUTO-ENTMCNC: 29.1 G/DL (ref 32–36.5)
MCV RBC AUTO: 85.6 FL (ref 78–100)
MONOCYTES # BLD: 0.5 K/MCL (ref 0.3–0.9)
MONOCYTES NFR BLD: 7 %
NEUTROPHILS # BLD: 6 K/MCL (ref 1.8–7.7)
NEUTROPHILS NFR BLD: 81 %
NRBC BLD MANUAL-RTO: 0 /100 WBC
PLATELET # BLD AUTO: 209 K/MCL (ref 140–450)
POTASSIUM SERPL-SCNC: 4.1 MMOL/L (ref 3.4–5.1)
RBC # BLD: 4.1 MIL/MCL (ref 4–5.2)
SODIUM SERPL-SCNC: 138 MMOL/L (ref 135–145)
WBC # BLD: 7.4 K/MCL (ref 4.2–11)

## 2021-02-05 PROCEDURE — 76775 US EXAM ABDO BACK WALL LIM: CPT

## 2021-02-05 PROCEDURE — 36415 COLL VENOUS BLD VENIPUNCTURE: CPT

## 2021-02-05 PROCEDURE — 93976 VASCULAR STUDY: CPT | Performed by: RADIOLOGY

## 2021-02-05 PROCEDURE — 99212 OFFICE O/P EST SF 10 MIN: CPT

## 2021-02-05 PROCEDURE — 10002807 HB RX 258: Performed by: RADIOLOGY

## 2021-02-05 PROCEDURE — 85025 COMPLETE CBC W/AUTO DIFF WBC: CPT

## 2021-02-05 PROCEDURE — 80158 DRUG ASSAY CYCLOSPORINE: CPT

## 2021-02-05 PROCEDURE — 93976 VASCULAR STUDY: CPT

## 2021-02-05 PROCEDURE — 74175 CTA ABDOMEN W/CONTRAST: CPT | Performed by: RADIOLOGY

## 2021-02-05 PROCEDURE — 80048 BASIC METABOLIC PNL TOTAL CA: CPT

## 2021-02-05 PROCEDURE — 10004026 HB COUNTER RECIPIENT HEART PHASE 6

## 2021-02-05 PROCEDURE — 83735 ASSAY OF MAGNESIUM: CPT

## 2021-02-05 PROCEDURE — 99496 TRANSJ CARE MGMT HIGH F2F 7D: CPT | Performed by: NURSE PRACTITIONER

## 2021-02-05 PROCEDURE — 74175 CTA ABDOMEN W/CONTRAST: CPT

## 2021-02-05 PROCEDURE — 10002805 HB CONTRAST AGENT: Performed by: RADIOLOGY

## 2021-02-05 RX ORDER — VITAMIN B COMPLEX
2000 TABLET ORAL
COMMUNITY

## 2021-02-05 RX ORDER — ERGOCALCIFEROL 1.25 MG/1
1.25 CAPSULE ORAL
Qty: 12 CAPSULE | Refills: 0 | Status: SHIPPED | OUTPATIENT
Start: 2021-02-08 | End: 2021-05-17 | Stop reason: SDUPTHER

## 2021-02-05 RX ADMIN — SODIUM CHLORIDE 50 ML: 9 INJECTION, SOLUTION INTRAVENOUS at 12:25

## 2021-02-05 RX ADMIN — IOPAMIDOL 80 ML: 755 INJECTION, SOLUTION INTRAVENOUS at 12:25

## 2021-02-05 ASSESSMENT — NEW YORK HEART ASSOCIATION (NYHA) CLASSIFICATION: NYHA FUNCTIONAL CLASS: I

## 2021-02-08 ENCOUNTER — TELEPHONE (OUTPATIENT)
Dept: TRANSPLANT | Age: 71
End: 2021-02-08

## 2021-03-12 ENCOUNTER — TELEPHONE (OUTPATIENT)
Dept: CARDIOLOGY | Age: 71
End: 2021-03-12

## 2021-03-12 ENCOUNTER — REMOTE DEVICE CHECK (OUTPATIENT)
Dept: ELECTROPHYSIOLOGY | Age: 71
End: 2021-03-12
Attending: INTERNAL MEDICINE

## 2021-03-12 DIAGNOSIS — Z95.0 PACEMAKER: Primary | ICD-10-CM

## 2021-03-12 PROCEDURE — 93294 REM INTERROG EVL PM/LDLS PM: CPT | Performed by: INTERNAL MEDICINE

## 2021-03-12 PROCEDURE — 93296 REM INTERROG EVL PM/IDS: CPT

## 2021-03-23 RX ORDER — CYCLOSPORINE 25 MG/1
50 CAPSULE ORAL 2 TIMES DAILY
Qty: 360 CAPSULE | Refills: 3 | Status: SHIPPED | OUTPATIENT
Start: 2021-03-23 | End: 2021-08-07 | Stop reason: SDUPTHER

## 2021-03-23 RX ORDER — PREDNISONE 2.5 MG/1
2.5 TABLET ORAL
Qty: 90 TABLET | Refills: 3 | Status: SHIPPED | OUTPATIENT
Start: 2021-03-23 | End: 2022-04-11 | Stop reason: SDUPTHER

## 2021-05-17 RX ORDER — ERGOCALCIFEROL 1.25 MG/1
1.25 CAPSULE ORAL
Qty: 12 CAPSULE | Refills: 0 | Status: SHIPPED | OUTPATIENT
Start: 2021-05-17 | End: 2022-09-26 | Stop reason: ALTCHOICE

## 2021-05-18 ENCOUNTER — TELEPHONE (OUTPATIENT)
Dept: TRANSPLANT | Age: 71
End: 2021-05-18

## 2021-05-24 RX ORDER — LEVOTHYROXINE SODIUM 0.07 MG/1
75 TABLET ORAL DAILY
Qty: 90 TABLET | Refills: 0 | Status: SHIPPED | OUTPATIENT
Start: 2021-05-24 | End: 2021-09-02 | Stop reason: SDUPTHER

## 2021-06-16 ENCOUNTER — REMOTE DEVICE CHECK (OUTPATIENT)
Dept: ELECTROPHYSIOLOGY | Age: 71
End: 2021-06-16
Attending: INTERNAL MEDICINE

## 2021-06-16 DIAGNOSIS — Z95.0 PACEMAKER: Primary | ICD-10-CM

## 2021-06-16 PROCEDURE — 93294 REM INTERROG EVL PM/LDLS PM: CPT | Performed by: INTERNAL MEDICINE

## 2021-06-16 PROCEDURE — 93296 REM INTERROG EVL PM/IDS: CPT

## 2021-07-23 ENCOUNTER — CLINICAL ABSTRACT (OUTPATIENT)
Dept: TRANSPLANT | Age: 71
End: 2021-07-23

## 2021-08-07 ENCOUNTER — TELEPHONE (OUTPATIENT)
Dept: TRANSPLANT | Age: 71
End: 2021-08-07

## 2021-08-08 RX ORDER — CYCLOSPORINE 25 MG/1
50 CAPSULE ORAL 2 TIMES DAILY
Qty: 28 CAPSULE | Refills: 0 | Status: SHIPPED
Start: 2021-08-08 | End: 2021-12-20 | Stop reason: SDUPTHER

## 2021-08-11 ENCOUNTER — TELEPHONE (OUTPATIENT)
Dept: TRANSPLANT | Age: 71
End: 2021-08-11

## 2021-08-27 RX ORDER — LEVOTHYROXINE SODIUM 0.07 MG/1
75 TABLET ORAL DAILY
Qty: 90 TABLET | Refills: 0 | OUTPATIENT
Start: 2021-08-27

## 2021-09-02 ENCOUNTER — TELEPHONE (OUTPATIENT)
Dept: TRANSPLANT | Age: 71
End: 2021-09-02

## 2021-09-02 RX ORDER — LEVOTHYROXINE SODIUM 0.07 MG/1
75 TABLET ORAL DAILY
Qty: 90 TABLET | Refills: 0 | Status: SHIPPED | OUTPATIENT
Start: 2021-09-02 | End: 2022-09-28 | Stop reason: DRUGHIGH

## 2021-09-13 RX ORDER — NIFEDIPINE 60 MG/1
60 TABLET, FILM COATED, EXTENDED RELEASE ORAL DAILY
Qty: 90 TABLET | Refills: 0 | Status: SHIPPED | OUTPATIENT
Start: 2021-09-13 | End: 2022-09-26 | Stop reason: ALTCHOICE

## 2021-09-13 RX ORDER — MYCOPHENOLATE MOFETIL 500 MG/1
500 TABLET ORAL 2 TIMES DAILY
Qty: 180 TABLET | Refills: 3 | Status: SHIPPED | OUTPATIENT
Start: 2021-09-13 | End: 2021-12-17 | Stop reason: SDUPTHER

## 2021-10-12 DIAGNOSIS — Z94.1 STATUS POST TRANSPLANT, HEART (CMD): Primary | ICD-10-CM

## 2021-10-13 ENCOUNTER — TELEPHONE (OUTPATIENT)
Dept: ELECTROPHYSIOLOGY | Age: 71
End: 2021-10-13

## 2021-10-18 ENCOUNTER — TELEPHONE (OUTPATIENT)
Dept: TRANSPLANT | Age: 71
End: 2021-10-18

## 2021-11-02 ENCOUNTER — REMOTE DEVICE CHECK (OUTPATIENT)
Dept: ELECTROPHYSIOLOGY | Age: 71
End: 2021-11-02
Attending: INTERNAL MEDICINE

## 2021-11-02 DIAGNOSIS — Z95.0 PACEMAKER: Primary | ICD-10-CM

## 2021-11-02 PROCEDURE — 93296 REM INTERROG EVL PM/IDS: CPT

## 2021-11-02 PROCEDURE — 93294 REM INTERROG EVL PM/LDLS PM: CPT | Performed by: INTERNAL MEDICINE

## 2021-12-17 ENCOUNTER — TELEPHONE (OUTPATIENT)
Dept: TRANSPLANT | Age: 71
End: 2021-12-17

## 2021-12-20 RX ORDER — MYCOPHENOLATE MOFETIL 500 MG/1
500 TABLET ORAL 2 TIMES DAILY
Qty: 180 TABLET | Refills: 3 | Status: SHIPPED | OUTPATIENT
Start: 2021-12-20 | End: 2022-10-21 | Stop reason: SDUPTHER

## 2021-12-20 RX ORDER — CYCLOSPORINE 25 MG/1
50 CAPSULE ORAL 2 TIMES DAILY
Qty: 360 CAPSULE | Refills: 3 | Status: SHIPPED | OUTPATIENT
Start: 2021-12-20 | End: 2021-12-31 | Stop reason: SDUPTHER

## 2021-12-31 ENCOUNTER — TELEPHONE (OUTPATIENT)
Dept: TRANSPLANT | Age: 71
End: 2021-12-31

## 2021-12-31 RX ORDER — CYCLOSPORINE 25 MG/1
50 CAPSULE ORAL 2 TIMES DAILY
Qty: 360 CAPSULE | Refills: 3 | Status: SHIPPED | OUTPATIENT
Start: 2021-12-31 | End: 2023-01-31 | Stop reason: SDUPTHER

## 2022-01-01 ENCOUNTER — EXTERNAL RECORD (OUTPATIENT)
Dept: OTHER | Age: 72
End: 2022-01-01

## 2022-01-04 ENCOUNTER — TELEPHONE (OUTPATIENT)
Dept: TRANSPLANT | Age: 72
End: 2022-01-04

## 2022-01-07 ENCOUNTER — TELEPHONE (OUTPATIENT)
Dept: TRANSPLANT | Age: 72
End: 2022-01-07

## 2022-01-24 ENCOUNTER — TELEPHONE (OUTPATIENT)
Dept: CARDIOLOGY | Age: 72
End: 2022-01-24

## 2022-01-24 ENCOUNTER — TELEPHONE (OUTPATIENT)
Dept: TRANSPLANT | Age: 72
End: 2022-01-24

## 2022-01-25 ENCOUNTER — APPOINTMENT (OUTPATIENT)
Dept: TRANSPLANT | Age: 72
End: 2022-01-25
Attending: INTERNAL MEDICINE

## 2022-01-25 ENCOUNTER — TELEPHONE (OUTPATIENT)
Dept: TRANSPLANT | Age: 72
End: 2022-01-25

## 2022-01-25 ENCOUNTER — APPOINTMENT (OUTPATIENT)
Dept: LAB | Age: 72
End: 2022-01-25
Attending: INTERNAL MEDICINE

## 2022-01-28 ENCOUNTER — TRANSCRIBE ORDER (OUTPATIENT)
Dept: SCHEDULING | Age: 72
End: 2022-01-28

## 2022-01-28 DIAGNOSIS — Z12.31 VISIT FOR SCREENING MAMMOGRAM: Primary | ICD-10-CM

## 2022-02-01 ENCOUNTER — HOSPITAL ENCOUNTER (OUTPATIENT)
Dept: MAMMOGRAPHY | Age: 72
Discharge: HOME | End: 2022-02-01
Attending: INTERNAL MEDICINE
Payer: MEDICARE

## 2022-02-01 DIAGNOSIS — Z12.31 VISIT FOR SCREENING MAMMOGRAM: ICD-10-CM

## 2022-02-01 PROCEDURE — 77063 BREAST TOMOSYNTHESIS BI: CPT

## 2022-02-02 ENCOUNTER — CLINICAL ABSTRACT (OUTPATIENT)
Dept: TRANSPLANT | Age: 72
End: 2022-02-02

## 2022-02-03 ENCOUNTER — TRANSCRIBE ORDER (OUTPATIENT)
Dept: SCHEDULING | Age: 72
End: 2022-02-03

## 2022-02-03 DIAGNOSIS — R92.8 OTHER ABNORMAL AND INCONCLUSIVE FINDINGS ON DIAGNOSTIC IMAGING OF BREAST: Primary | ICD-10-CM

## 2022-02-07 ENCOUNTER — CLINICAL ABSTRACT (OUTPATIENT)
Dept: HEALTH INFORMATION MANAGEMENT | Age: 72
End: 2022-02-07

## 2022-02-10 ENCOUNTER — TRANSCRIBE ORDER (OUTPATIENT)
Dept: SCHEDULING | Age: 72
End: 2022-02-10

## 2022-02-10 DIAGNOSIS — R92.8 OTHER ABNORMAL AND INCONCLUSIVE FINDINGS ON DIAGNOSTIC IMAGING OF BREAST: Primary | ICD-10-CM

## 2022-02-22 DIAGNOSIS — Z79.899 OTHER LONG TERM (CURRENT) DRUG THERAPY: ICD-10-CM

## 2022-02-22 DIAGNOSIS — I10 ESSENTIAL (PRIMARY) HYPERTENSION: ICD-10-CM

## 2022-02-22 DIAGNOSIS — Z94.1 STATUS POST TRANSPLANT, HEART (CMD): ICD-10-CM

## 2022-02-22 DIAGNOSIS — N18.32 STAGE 3B CHRONIC KIDNEY DISEASE (CMD): ICD-10-CM

## 2022-02-22 DIAGNOSIS — Z12.31 ENCOUNTER FOR SCREENING MAMMOGRAM FOR MALIGNANT NEOPLASM OF BREAST: ICD-10-CM

## 2022-02-22 DIAGNOSIS — I10 ESSENTIAL (PRIMARY) HYPERTENSION: Primary | ICD-10-CM

## 2022-03-14 ENCOUNTER — HOSPITAL ENCOUNTER (OUTPATIENT)
Dept: MAMMOGRAPHY | Age: 72
Discharge: HOME | End: 2022-03-14
Attending: INTERNAL MEDICINE
Payer: MEDICARE

## 2022-03-14 ENCOUNTER — HOSPITAL ENCOUNTER (OUTPATIENT)
Dept: ULTRASOUND IMAGING | Age: 72
Discharge: HOME | End: 2022-03-14
Attending: INTERNAL MEDICINE
Payer: MEDICARE

## 2022-03-14 DIAGNOSIS — R92.8 OTHER ABNORMAL AND INCONCLUSIVE FINDINGS ON DIAGNOSTIC IMAGING OF BREAST: ICD-10-CM

## 2022-03-14 PROCEDURE — 76642 ULTRASOUND BREAST LIMITED: CPT

## 2022-03-14 PROCEDURE — 77061 BREAST TOMOSYNTHESIS UNI: CPT

## 2022-03-16 ENCOUNTER — TELEPHONE (OUTPATIENT)
Dept: TRANSPLANT | Age: 72
End: 2022-03-16

## 2022-03-29 ENCOUNTER — HOSPITAL ENCOUNTER (OUTPATIENT)
Dept: MAMMOGRAPHY | Age: 72
Discharge: HOME | End: 2022-03-29
Attending: INTERNAL MEDICINE
Payer: MEDICARE

## 2022-03-29 DIAGNOSIS — N63.0 LUMP OR MASS IN BREAST: ICD-10-CM

## 2022-03-29 DIAGNOSIS — R92.8 ABNORMAL MAMMOGRAM: ICD-10-CM

## 2022-03-29 PROCEDURE — 74011000250 HC RX REV CODE- 250: Performed by: INTERNAL MEDICINE

## 2022-03-29 PROCEDURE — 77065 DX MAMMO INCL CAD UNI: CPT

## 2022-03-29 PROCEDURE — 88360 TUMOR IMMUNOHISTOCHEM/MANUAL: CPT

## 2022-03-29 PROCEDURE — 88341 IMHCHEM/IMCYTCHM EA ADD ANTB: CPT

## 2022-03-29 PROCEDURE — 88342 IMHCHEM/IMCYTCHM 1ST ANTB: CPT

## 2022-03-29 PROCEDURE — 19081 BX BREAST 1ST LESION STRTCTC: CPT

## 2022-03-29 PROCEDURE — 88305 TISSUE EXAM BY PATHOLOGIST: CPT

## 2022-03-29 RX ORDER — LIDOCAINE HYDROCHLORIDE 10 MG/ML
5 INJECTION, SOLUTION EPIDURAL; INFILTRATION; INTRACAUDAL; PERINEURAL
Status: COMPLETED | OUTPATIENT
Start: 2022-03-29 | End: 2022-03-29

## 2022-03-29 RX ORDER — LIDOCAINE HYDROCHLORIDE AND EPINEPHRINE 10; 10 MG/ML; UG/ML
1.5 INJECTION, SOLUTION INFILTRATION; PERINEURAL
Status: COMPLETED | OUTPATIENT
Start: 2022-03-29 | End: 2022-03-29

## 2022-03-29 RX ADMIN — LIDOCAINE HYDROCHLORIDE AND EPINEPHRINE 20 ML: 10; 10 INJECTION, SOLUTION INFILTRATION; PERINEURAL at 13:10

## 2022-03-29 RX ADMIN — LIDOCAINE HYDROCHLORIDE 3 ML: 10 INJECTION, SOLUTION EPIDURAL; INFILTRATION; INTRACAUDAL; PERINEURAL at 13:11

## 2022-04-01 ENCOUNTER — NURSE NAVIGATOR (OUTPATIENT)
Dept: SURGERY | Age: 72
End: 2022-04-01

## 2022-04-01 NOTE — PROGRESS NOTES
Initial meeting to discuss recent breast biopsy results. Danie Roberts is diagnosed with left breast DCIS. Dr. Lauren Pollard reviewed results with patient and answered all questions to patient's satisfaction. I provided emotional support, a printed copy of pathology results, and additional breast cancer education. Patient prefers a referral to Dr. Lisy Mcmullen. Pathology, mammography, and US reports faxed to Dr. Mark Acosta office at [phone]. Patient encouraged to contact me with any questions or concerns.

## 2022-04-11 RX ORDER — PREDNISONE 2.5 MG/1
2.5 TABLET ORAL
Qty: 90 TABLET | Refills: 3 | Status: SHIPPED | OUTPATIENT
Start: 2022-04-11 | End: 2023-04-24

## 2022-06-08 ENCOUNTER — REMOTE DEVICE CHECK (OUTPATIENT)
Dept: ELECTROPHYSIOLOGY | Age: 72
End: 2022-06-08
Attending: INTERNAL MEDICINE

## 2022-06-08 DIAGNOSIS — Z95.0 CARDIAC PACEMAKER IN SITU: Primary | ICD-10-CM

## 2022-06-08 DIAGNOSIS — Z95.0 PACEMAKER: ICD-10-CM

## 2022-06-08 PROCEDURE — 93296 REM INTERROG EVL PM/IDS: CPT

## 2022-06-08 PROCEDURE — 93294 REM INTERROG EVL PM/LDLS PM: CPT | Performed by: INTERNAL MEDICINE

## 2022-06-09 ENCOUNTER — TELEPHONE (OUTPATIENT)
Dept: CARDIOLOGY | Age: 72
End: 2022-06-09

## 2022-06-14 ENCOUNTER — CLINICAL ABSTRACT (OUTPATIENT)
Dept: TRANSPLANT | Age: 72
End: 2022-06-14

## 2022-06-24 ENCOUNTER — TELEPHONE (OUTPATIENT)
Dept: TRANSPLANT | Age: 72
End: 2022-06-24

## 2022-06-29 ENCOUNTER — TELEPHONE (OUTPATIENT)
Dept: TRANSPLANT | Age: 72
End: 2022-06-29

## 2022-07-27 ENCOUNTER — TELEPHONE (OUTPATIENT)
Dept: TRANSPLANT | Age: 72
End: 2022-07-27

## 2022-07-27 DIAGNOSIS — I10 ESSENTIAL (PRIMARY) HYPERTENSION: ICD-10-CM

## 2022-07-27 DIAGNOSIS — Z12.31 ENCOUNTER FOR SCREENING MAMMOGRAM FOR MALIGNANT NEOPLASM OF BREAST: ICD-10-CM

## 2022-07-27 DIAGNOSIS — D84.9 IMMUNOSUPPRESSION (CMD): ICD-10-CM

## 2022-07-27 DIAGNOSIS — E55.9 VITAMIN D DEFICIENCY: ICD-10-CM

## 2022-07-27 DIAGNOSIS — Z94.1 STATUS POST TRANSPLANT, HEART (CMD): ICD-10-CM

## 2022-07-27 DIAGNOSIS — I10 ESSENTIAL (PRIMARY) HYPERTENSION: Primary | ICD-10-CM

## 2022-07-27 DIAGNOSIS — Z79.899 OTHER LONG TERM (CURRENT) DRUG THERAPY: ICD-10-CM

## 2022-07-27 DIAGNOSIS — N18.32 STAGE 3B CHRONIC KIDNEY DISEASE (CMD): ICD-10-CM

## 2022-08-04 DIAGNOSIS — Z12.31 VISIT FOR SCREENING MAMMOGRAM: Primary | ICD-10-CM

## 2022-09-08 ENCOUNTER — TELEPHONE (OUTPATIENT)
Dept: TRANSPLANT | Age: 72
End: 2022-09-08

## 2022-09-26 ENCOUNTER — LAB SERVICES (OUTPATIENT)
Dept: LAB | Age: 72
End: 2022-09-26
Attending: INTERNAL MEDICINE

## 2022-09-26 ENCOUNTER — HOSPITAL ENCOUNTER (OUTPATIENT)
Age: 72
Setting detail: OBSERVATION
Discharge: HOME | End: 2022-09-27
Attending: EMERGENCY MEDICINE | Admitting: HOSPITALIST

## 2022-09-26 ENCOUNTER — HOSPITAL ENCOUNTER (OUTPATIENT)
Dept: MAMMOGRAPHY | Age: 72
Discharge: HOME | End: 2022-09-26
Attending: INTERNAL MEDICINE

## 2022-09-26 ENCOUNTER — HOSPITAL ENCOUNTER (OUTPATIENT)
Dept: NUCLEAR MEDICINE | Age: 72
Discharge: HOME | End: 2022-09-26
Attending: INTERNAL MEDICINE

## 2022-09-26 ENCOUNTER — APPOINTMENT (OUTPATIENT)
Dept: GENERAL RADIOLOGY | Age: 72
End: 2022-09-26
Attending: EMERGENCY MEDICINE

## 2022-09-26 ENCOUNTER — HOSPITAL ENCOUNTER (OUTPATIENT)
Dept: CV DIAGNOSTICS | Age: 72
Discharge: HOME | End: 2022-09-26
Attending: INTERNAL MEDICINE

## 2022-09-26 ENCOUNTER — OFFICE VISIT (OUTPATIENT)
Dept: TRANSPLANT | Age: 72
End: 2022-09-26
Attending: INTERNAL MEDICINE

## 2022-09-26 VITALS
DIASTOLIC BLOOD PRESSURE: 62 MMHG | HEART RATE: 80 BPM | WEIGHT: 112 LBS | SYSTOLIC BLOOD PRESSURE: 132 MMHG | BODY MASS INDEX: 20.49 KG/M2

## 2022-09-26 DIAGNOSIS — Z94.1 STATUS POST TRANSPLANT, HEART (CMD): ICD-10-CM

## 2022-09-26 DIAGNOSIS — D64.9 ANEMIA, UNSPECIFIED TYPE: Primary | ICD-10-CM

## 2022-09-26 DIAGNOSIS — I10 ESSENTIAL (PRIMARY) HYPERTENSION: ICD-10-CM

## 2022-09-26 DIAGNOSIS — Z94.1 HEART TRANSPLANT RECIPIENT (CMD): ICD-10-CM

## 2022-09-26 DIAGNOSIS — D84.9 IMMUNOSUPPRESSION (CMD): ICD-10-CM

## 2022-09-26 DIAGNOSIS — Z79.899 OTHER LONG TERM (CURRENT) DRUG THERAPY: ICD-10-CM

## 2022-09-26 DIAGNOSIS — Z12.31 VISIT FOR SCREENING MAMMOGRAM: ICD-10-CM

## 2022-09-26 DIAGNOSIS — K57.30 PANCOLONIC DIVERTICULOSIS: ICD-10-CM

## 2022-09-26 DIAGNOSIS — E55.9 VITAMIN D DEFICIENCY: ICD-10-CM

## 2022-09-26 DIAGNOSIS — N18.30 STAGE 3 CHRONIC KIDNEY DISEASE, UNSPECIFIED WHETHER STAGE 3A OR 3B CKD (CMD): ICD-10-CM

## 2022-09-26 LAB
25(OH)D3+25(OH)D2 SERPL-MCNC: 41.2 NG/ML (ref 30–100)
ABO + RH BLD: NORMAL
ALBUMIN SERPL-MCNC: 3.5 G/DL (ref 3.6–5.1)
ALBUMIN SERPL-MCNC: 3.9 G/DL (ref 3.6–5.1)
ALBUMIN/GLOB SERPL: 0.9 {RATIO} (ref 1–2.4)
ALBUMIN/GLOB SERPL: 0.9 {RATIO} (ref 1–2.4)
ALP SERPL-CCNC: 78 UNITS/L (ref 45–117)
ALP SERPL-CCNC: 85 UNITS/L (ref 45–117)
ALT SERPL-CCNC: 10 UNITS/L
ALT SERPL-CCNC: 11 UNITS/L
ANION GAP SERPL CALC-SCNC: 10 MMOL/L (ref 7–19)
ANION GAP SERPL CALC-SCNC: 12 MMOL/L (ref 7–19)
APTT PPP: 28 SEC (ref 22–30)
AST SERPL-CCNC: 10 UNITS/L
AST SERPL-CCNC: 9 UNITS/L
ATRIAL RATE (BPM): 74
BASOPHILS # BLD: 0 K/MCL (ref 0–0.3)
BASOPHILS # BLD: 0 K/MCL (ref 0–0.3)
BASOPHILS NFR BLD: 0 %
BASOPHILS NFR BLD: 0 %
BILIRUB SERPL-MCNC: 0.3 MG/DL (ref 0.2–1)
BILIRUB SERPL-MCNC: 0.3 MG/DL (ref 0.2–1)
BLD GP AB SCN SERPL QL GEL: NEGATIVE
BLOOD EXPIRATION DATE: NORMAL
BUN SERPL-MCNC: 24 MG/DL (ref 6–20)
BUN SERPL-MCNC: 25 MG/DL (ref 6–20)
BUN/CREAT SERPL: 12 (ref 7–25)
BUN/CREAT SERPL: 13 (ref 7–25)
CALCIUM SERPL-MCNC: 9.1 MG/DL (ref 8.4–10.2)
CALCIUM SERPL-MCNC: 9.5 MG/DL (ref 8.4–10.2)
CHLORIDE SERPL-SCNC: 110 MMOL/L (ref 97–110)
CHLORIDE SERPL-SCNC: 111 MMOL/L (ref 97–110)
CHOLEST SERPL-MCNC: 159 MG/DL
CHOLEST/HDLC SERPL: 2.2 {RATIO}
CO2 SERPL-SCNC: 24 MMOL/L (ref 21–32)
CO2 SERPL-SCNC: 24 MMOL/L (ref 21–32)
CREAT SERPL-MCNC: 1.91 MG/DL (ref 0.51–0.95)
CREAT SERPL-MCNC: 2.06 MG/DL (ref 0.51–0.95)
CROSSMATCH RESULT: NORMAL
CYCLOSPORINE BLD-MCNC: 87 NG/ML (ref 100–400)
DEPRECATED RDW RBC: 48.5 FL (ref 39–50)
DEPRECATED RDW RBC: 48.7 FL (ref 39–50)
DISPENSE STATUS: NORMAL
EOSINOPHIL # BLD: 0.1 K/MCL (ref 0–0.5)
EOSINOPHIL # BLD: 0.1 K/MCL (ref 0–0.5)
EOSINOPHIL NFR BLD: 1 %
EOSINOPHIL NFR BLD: 4 %
ERYTHROCYTE [DISTWIDTH] IN BLOOD: 15.2 % (ref 11–15)
ERYTHROCYTE [DISTWIDTH] IN BLOOD: 15.2 % (ref 11–15)
FASTING DURATION TIME PATIENT: ABNORMAL H
FASTING DURATION TIME PATIENT: ABNORMAL H
FASTING DURATION TIME PATIENT: NORMAL H
GFR SERPLBLD BASED ON 1.73 SQ M-ARVRAT: 25 ML/MIN
GFR SERPLBLD BASED ON 1.73 SQ M-ARVRAT: 27 ML/MIN
GLOBULIN SER-MCNC: 3.8 G/DL (ref 2–4)
GLOBULIN SER-MCNC: 4.2 G/DL (ref 2–4)
GLUCOSE SERPL-MCNC: 104 MG/DL (ref 70–99)
GLUCOSE SERPL-MCNC: 89 MG/DL (ref 70–99)
HBA1C MFR BLD: 6.7 % (ref 4.5–5.6)
HCT VFR BLD CALC: 22.3 % (ref 36–46.5)
HCT VFR BLD CALC: 24.4 % (ref 36–46.5)
HDLC SERPL-MCNC: 72 MG/DL
HGB BLD-MCNC: 6.3 G/DL (ref 12–15.5)
HGB BLD-MCNC: 6.9 G/DL (ref 12–15.5)
IMM GRANULOCYTES # BLD AUTO: 0 K/MCL (ref 0–0.2)
IMM GRANULOCYTES # BLD AUTO: 0 K/MCL (ref 0–0.2)
IMM GRANULOCYTES # BLD: 0 %
IMM GRANULOCYTES # BLD: 1 %
INR PPP: 1
ISBT BLOOD TYPE: 1700
ISSUE DATE/TIME: NORMAL
LDLC SERPL CALC-MCNC: 63 MG/DL
LYMPHOCYTES # BLD: 0.7 K/MCL (ref 1–4)
LYMPHOCYTES # BLD: 0.9 K/MCL (ref 1–4)
LYMPHOCYTES NFR BLD: 16 %
LYMPHOCYTES NFR BLD: 21 %
MAGNESIUM SERPL-MCNC: 2 MG/DL (ref 1.7–2.4)
MCH RBC QN AUTO: 24.6 PG (ref 26–34)
MCH RBC QN AUTO: 24.7 PG (ref 26–34)
MCHC RBC AUTO-ENTMCNC: 28.3 G/DL (ref 32–36.5)
MCHC RBC AUTO-ENTMCNC: 28.3 G/DL (ref 32–36.5)
MCV RBC AUTO: 87.1 FL (ref 78–100)
MCV RBC AUTO: 87.5 FL (ref 78–100)
MONOCYTES # BLD: 0.3 K/MCL (ref 0.3–0.9)
MONOCYTES # BLD: 0.3 K/MCL (ref 0.3–0.9)
MONOCYTES NFR BLD: 5 %
MONOCYTES NFR BLD: 8 %
NEUTROPHILS # BLD: 2.2 K/MCL (ref 1.8–7.7)
NEUTROPHILS # BLD: 4.1 K/MCL (ref 1.8–7.7)
NEUTROPHILS NFR BLD: 66 %
NEUTROPHILS NFR BLD: 78 %
NONHDLC SERPL-MCNC: 87 MG/DL
NRBC BLD MANUAL-RTO: 0 /100 WBC
NRBC BLD MANUAL-RTO: 0 /100 WBC
NT-PROBNP SERPL-MCNC: 817 PG/ML
P AXIS (DEGREES): 55
PLATELET # BLD AUTO: 224 K/MCL (ref 140–450)
PLATELET # BLD AUTO: 266 K/MCL (ref 140–450)
POTASSIUM SERPL-SCNC: 3.9 MMOL/L (ref 3.4–5.1)
POTASSIUM SERPL-SCNC: 4.5 MMOL/L (ref 3.4–5.1)
PR-INTERVAL (MSEC): 208
PRODUCT CODE: NORMAL
PRODUCT DESCRIPTION: NORMAL
PRODUCT ID: NORMAL
PROT SERPL-MCNC: 7.3 G/DL (ref 6.4–8.2)
PROT SERPL-MCNC: 8.1 G/DL (ref 6.4–8.2)
PROTHROMBIN TIME: 10.6 SEC (ref 9.7–11.8)
QRS-INTERVAL (MSEC): 156
QT-INTERVAL (MSEC): 450
QTC: 499
R AXIS (DEGREES): -92
RBC # BLD: 2.56 MIL/MCL (ref 4–5.2)
RBC # BLD: 2.79 MIL/MCL (ref 4–5.2)
REPORT TEXT: NORMAL
SARS-COV-2 RNA RESP QL NAA+PROBE: NOT DETECTED
SERVICE CMNT-IMP: NORMAL
SERVICE CMNT-IMP: NORMAL
SODIUM SERPL-SCNC: 140 MMOL/L (ref 135–145)
SODIUM SERPL-SCNC: 142 MMOL/L (ref 135–145)
T AXIS (DEGREES): 82
TRIGL SERPL-MCNC: 118 MG/DL
TSH SERPL-ACNC: 0.31 MCUNITS/ML (ref 0.35–5)
TYPE AND SCREEN EXPIRATION DATE: NORMAL
UNIT BLOOD TYPE: NORMAL
UNIT NUMBER: NORMAL
VENTRICULAR RATE EKG/MIN (BPM): 74
WBC # BLD: 3.3 K/MCL (ref 4.2–11)
WBC # BLD: 5.3 K/MCL (ref 4.2–11)

## 2022-09-26 PROCEDURE — 85730 THROMBOPLASTIN TIME PARTIAL: CPT | Performed by: EMERGENCY MEDICINE

## 2022-09-26 PROCEDURE — G1004 CDSM NDSC: HCPCS | Performed by: INTERNAL MEDICINE

## 2022-09-26 PROCEDURE — 83880 ASSAY OF NATRIURETIC PEPTIDE: CPT | Performed by: EMERGENCY MEDICINE

## 2022-09-26 PROCEDURE — G0378 HOSPITAL OBSERVATION PER HR: HCPCS

## 2022-09-26 PROCEDURE — 36000 PLACE NEEDLE IN VEIN: CPT

## 2022-09-26 PROCEDURE — 77063 BREAST TOMOSYNTHESIS BI: CPT | Performed by: STUDENT IN AN ORGANIZED HEALTH CARE EDUCATION/TRAINING PROGRAM

## 2022-09-26 PROCEDURE — 85025 COMPLETE CBC W/AUTO DIFF WBC: CPT

## 2022-09-26 PROCEDURE — 82306 VITAMIN D 25 HYDROXY: CPT

## 2022-09-26 PROCEDURE — 93017 CV STRESS TEST TRACING ONLY: CPT

## 2022-09-26 PROCEDURE — 93018 CV STRESS TEST I&R ONLY: CPT | Performed by: INTERNAL MEDICINE

## 2022-09-26 PROCEDURE — 36415 COLL VENOUS BLD VENIPUNCTURE: CPT

## 2022-09-26 PROCEDURE — 71045 X-RAY EXAM CHEST 1 VIEW: CPT | Performed by: RADIOLOGY

## 2022-09-26 PROCEDURE — 10004651 HB RX, NO CHARGE ITEM: Performed by: STUDENT IN AN ORGANIZED HEALTH CARE EDUCATION/TRAINING PROGRAM

## 2022-09-26 PROCEDURE — 10002803 HB RX 637: Performed by: STUDENT IN AN ORGANIZED HEALTH CARE EDUCATION/TRAINING PROGRAM

## 2022-09-26 PROCEDURE — 77067 SCR MAMMO BI INCL CAD: CPT | Performed by: STUDENT IN AN ORGANIZED HEALTH CARE EDUCATION/TRAINING PROGRAM

## 2022-09-26 PROCEDURE — P9016 RBC LEUKOCYTES REDUCED: HCPCS

## 2022-09-26 PROCEDURE — 84443 ASSAY THYROID STIM HORMONE: CPT

## 2022-09-26 PROCEDURE — 80158 DRUG ASSAY CYCLOSPORINE: CPT

## 2022-09-26 PROCEDURE — 77063 BREAST TOMOSYNTHESIS BI: CPT

## 2022-09-26 PROCEDURE — 71045 X-RAY EXAM CHEST 1 VIEW: CPT

## 2022-09-26 PROCEDURE — 36430 TRANSFUSION BLD/BLD COMPNT: CPT

## 2022-09-26 PROCEDURE — 93306 TTE W/DOPPLER COMPLETE: CPT | Performed by: INTERNAL MEDICINE

## 2022-09-26 PROCEDURE — 99285 EMERGENCY DEPT VISIT HI MDM: CPT

## 2022-09-26 PROCEDURE — G1004 CDSM NDSC: HCPCS

## 2022-09-26 PROCEDURE — 10004026 HB COUNTER RECIPIENT HEART PHASE 6

## 2022-09-26 PROCEDURE — 10002800 HB RX 250 W HCPCS: Performed by: INTERNAL MEDICINE

## 2022-09-26 PROCEDURE — 80061 LIPID PANEL: CPT

## 2022-09-26 PROCEDURE — 80053 COMPREHEN METABOLIC PANEL: CPT | Performed by: EMERGENCY MEDICINE

## 2022-09-26 PROCEDURE — 93005 ELECTROCARDIOGRAM TRACING: CPT | Performed by: EMERGENCY MEDICINE

## 2022-09-26 PROCEDURE — 86923 COMPATIBILITY TEST ELECTRIC: CPT

## 2022-09-26 PROCEDURE — 83735 ASSAY OF MAGNESIUM: CPT

## 2022-09-26 PROCEDURE — 99220 INITIAL OBSERVATION CARE,LEVL III: CPT | Performed by: STUDENT IN AN ORGANIZED HEALTH CARE EDUCATION/TRAINING PROGRAM

## 2022-09-26 PROCEDURE — 93016 CV STRESS TEST SUPVJ ONLY: CPT | Performed by: INTERNAL MEDICINE

## 2022-09-26 PROCEDURE — 76376 3D RENDER W/INTRP POSTPROCES: CPT | Performed by: INTERNAL MEDICINE

## 2022-09-26 PROCEDURE — 85610 PROTHROMBIN TIME: CPT | Performed by: EMERGENCY MEDICINE

## 2022-09-26 PROCEDURE — 10002803 HB RX 637: Performed by: INTERNAL MEDICINE

## 2022-09-26 PROCEDURE — G0379 DIRECT REFER HOSPITAL OBSERV: HCPCS

## 2022-09-26 PROCEDURE — 83036 HEMOGLOBIN GLYCOSYLATED A1C: CPT

## 2022-09-26 PROCEDURE — 93017 CV STRESS TEST TRACING ONLY: CPT | Performed by: INTERNAL MEDICINE

## 2022-09-26 PROCEDURE — 87635 SARS-COV-2 COVID-19 AMP PRB: CPT | Performed by: EMERGENCY MEDICINE

## 2022-09-26 PROCEDURE — 86850 RBC ANTIBODY SCREEN: CPT | Performed by: EMERGENCY MEDICINE

## 2022-09-26 PROCEDURE — 93356 MYOCRD STRAIN IMG SPCKL TRCK: CPT | Performed by: INTERNAL MEDICINE

## 2022-09-26 PROCEDURE — 99212 OFFICE O/P EST SF 10 MIN: CPT

## 2022-09-26 PROCEDURE — 78452 HT MUSCLE IMAGE SPECT MULT: CPT

## 2022-09-26 PROCEDURE — 93356 MYOCRD STRAIN IMG SPCKL TRCK: CPT

## 2022-09-26 PROCEDURE — 85025 COMPLETE CBC W/AUTO DIFF WBC: CPT | Performed by: EMERGENCY MEDICINE

## 2022-09-26 PROCEDURE — 78452 HT MUSCLE IMAGE SPECT MULT: CPT | Performed by: INTERNAL MEDICINE

## 2022-09-26 PROCEDURE — 99215 OFFICE O/P EST HI 40 MIN: CPT | Performed by: INTERNAL MEDICINE

## 2022-09-26 PROCEDURE — 80053 COMPREHEN METABOLIC PANEL: CPT

## 2022-09-26 RX ORDER — PANTOPRAZOLE SODIUM 40 MG/1
40 TABLET, DELAYED RELEASE ORAL EVERY 12 HOURS SCHEDULED
Status: DISCONTINUED | OUTPATIENT
Start: 2022-09-26 | End: 2022-09-27 | Stop reason: HOSPADM

## 2022-09-26 RX ORDER — 0.9 % SODIUM CHLORIDE 0.9 %
2 VIAL (ML) INJECTION EVERY 12 HOURS SCHEDULED
Status: DISCONTINUED | OUTPATIENT
Start: 2022-09-26 | End: 2022-09-27 | Stop reason: HOSPADM

## 2022-09-26 RX ORDER — ROSUVASTATIN CALCIUM 20 MG/1
40 TABLET, COATED ORAL NIGHTLY
Status: DISCONTINUED | OUTPATIENT
Start: 2022-09-26 | End: 2022-09-27 | Stop reason: HOSPADM

## 2022-09-26 RX ORDER — PREDNISONE 2.5 MG/1
2.5 TABLET ORAL
Status: DISCONTINUED | OUTPATIENT
Start: 2022-09-27 | End: 2022-09-27 | Stop reason: HOSPADM

## 2022-09-26 RX ORDER — ACETAMINOPHEN 325 MG/1
650 TABLET ORAL EVERY 4 HOURS PRN
Status: DISCONTINUED | OUTPATIENT
Start: 2022-09-26 | End: 2022-09-27 | Stop reason: HOSPADM

## 2022-09-26 RX ORDER — CLOPIDOGREL BISULFATE 75 MG/1
75 TABLET ORAL
COMMUNITY
End: 2022-09-27

## 2022-09-26 RX ORDER — PANTOPRAZOLE SODIUM 40 MG/10ML
40 INJECTION, POWDER, LYOPHILIZED, FOR SOLUTION INTRAVENOUS EVERY 12 HOURS SCHEDULED
Status: DISCONTINUED | OUTPATIENT
Start: 2022-09-26 | End: 2022-09-26

## 2022-09-26 RX ORDER — REGADENOSON 0.08 MG/ML
0.4 INJECTION, SOLUTION INTRAVENOUS ONCE
Status: COMPLETED | OUTPATIENT
Start: 2022-09-26 | End: 2022-09-26

## 2022-09-26 RX ORDER — MYCOPHENOLATE MOFETIL 500 MG/1
500 TABLET ORAL
Status: DISCONTINUED | OUTPATIENT
Start: 2022-09-26 | End: 2022-09-27 | Stop reason: HOSPADM

## 2022-09-26 RX ORDER — NIFEDIPINE 30 MG/1
30 TABLET, EXTENDED RELEASE ORAL 2 TIMES DAILY PRN
COMMUNITY
Start: 2022-09-15

## 2022-09-26 RX ORDER — SODIUM CHLORIDE 9 MG/ML
INJECTION, SOLUTION INTRAVENOUS CONTINUOUS PRN
Status: DISCONTINUED | OUTPATIENT
Start: 2022-09-26 | End: 2022-09-27 | Stop reason: HOSPADM

## 2022-09-26 RX ORDER — ROSUVASTATIN CALCIUM 40 MG/1
40 TABLET, COATED ORAL NIGHTLY
COMMUNITY

## 2022-09-26 RX ORDER — AMOXICILLIN 250 MG
2 CAPSULE ORAL DAILY PRN
Status: DISCONTINUED | OUTPATIENT
Start: 2022-09-26 | End: 2022-09-27 | Stop reason: HOSPADM

## 2022-09-26 RX ORDER — NIFEDIPINE 30 MG/1
30 TABLET, EXTENDED RELEASE ORAL 2 TIMES DAILY
Status: DISCONTINUED | OUTPATIENT
Start: 2022-09-26 | End: 2022-09-27 | Stop reason: SDUPTHER

## 2022-09-26 RX ORDER — POLYETHYLENE GLYCOL 3350 17 G/17G
17 POWDER, FOR SOLUTION ORAL DAILY PRN
Status: DISCONTINUED | OUTPATIENT
Start: 2022-09-26 | End: 2022-09-27 | Stop reason: HOSPADM

## 2022-09-26 RX ORDER — LEVOTHYROXINE SODIUM 0.07 MG/1
75 TABLET ORAL
Status: DISCONTINUED | OUTPATIENT
Start: 2022-09-27 | End: 2022-09-27 | Stop reason: HOSPADM

## 2022-09-26 RX ADMIN — REGADENOSON 0.4 MG: 0.08 INJECTION, SOLUTION INTRAVENOUS at 10:32

## 2022-09-26 RX ADMIN — MYCOPHENOLATE MOFETIL 500 MG: 500 TABLET, FILM COATED ORAL at 23:05

## 2022-09-26 RX ADMIN — ROSUVASTATIN CALCIUM 40 MG: 20 TABLET, FILM COATED ORAL at 23:05

## 2022-09-26 RX ADMIN — SODIUM CHLORIDE, PRESERVATIVE FREE 2 ML: 5 INJECTION INTRAVENOUS at 23:06

## 2022-09-26 RX ADMIN — PANTOPRAZOLE SODIUM 40 MG: 40 TABLET, DELAYED RELEASE ORAL at 23:04

## 2022-09-26 ASSESSMENT — LIFESTYLE VARIABLES
HOW MANY STANDARD DRINKS CONTAINING ALCOHOL DO YOU HAVE ON A TYPICAL DAY: 0,1 OR 2
HOW OFTEN DO YOU HAVE 6 OR MORE DRINKS ON ONE OCCASION: NEVER
SHORT OF BREATH OR FATIGUE WITH ADLS: YES
IS PATIENT ABLE TO COMPLETE ASSESSMENT AT THIS TIME: YES
RECENT DECLINE IN ADLS: NO
CHRONIC/CANCER PAIN PRESENT: NO
ADL BEFORE ADMISSION: INDEPENDENT
AUDIT-C TOTAL SCORE: 0
RECENTLY LOST WEIGHT WITHOUT TRYING: 2
ALCOHOL_USE_STATUS: NO OR LOW RISK WITH VALIDATED TOOL
HAVE YOU BEEN EATING POORLY BECAUSE OF A DECREASED APPETITE: 1
LAST DRINK YOU HAD: DENIES INTAKE
HOW OFTEN DO YOU HAVE A DRINK CONTAINING ALCOHOL: NEVER
SMOKING_YEARS: NO
HISTORY OF PROBLEMS WHEN YOU STOP DRINKING ALCOHOL: NO

## 2022-09-26 ASSESSMENT — PATIENT HEALTH QUESTIONNAIRE - PHQ9
1. LITTLE INTEREST OR PLEASURE IN DOING THINGS: NOT AT ALL
CLINICAL INTERPRETATION OF PHQ2 SCORE: NO FURTHER SCREENING NEEDED
SUM OF ALL RESPONSES TO PHQ9 QUESTIONS 1 AND 2: 0
IS PATIENT ABLE TO COMPLETE PHQ2 OR PHQ9: YES
2. FEELING DOWN, DEPRESSED OR HOPELESS: NOT AT ALL
SUM OF ALL RESPONSES TO PHQ9 QUESTIONS 1 AND 2: 0

## 2022-09-26 ASSESSMENT — ENCOUNTER SYMPTOMS
RESPIRATORY NEGATIVE: 1
COUGH: 0
VOMITING: 0
APPETITE CHANGE: 1
BLOOD IN STOOL: 0
SHORTNESS OF BREATH: 0
ABDOMINAL PAIN: 0
WOUND: 0
WHEEZING: 0
WEAKNESS: 1
ABDOMINAL DISTENTION: 0
PSYCHIATRIC NEGATIVE: 1
NUMBNESS: 0
LIGHT-HEADEDNESS: 1
SORE THROAT: 0
SEIZURES: 0
GASTROINTESTINAL NEGATIVE: 1
CHILLS: 0
DIARRHEA: 0
FEVER: 0
DIZZINESS: 0
CONSTIPATION: 0
EYE PAIN: 0
DIAPHORESIS: 0
NAUSEA: 0
ACTIVITY CHANGE: 0
FATIGUE: 1
HEADACHES: 0
EYES NEGATIVE: 1
RHINORRHEA: 0
BACK PAIN: 0

## 2022-09-26 ASSESSMENT — COLUMBIA-SUICIDE SEVERITY RATING SCALE - C-SSRS
6. HAVE YOU EVER DONE ANYTHING, STARTED TO DO ANYTHING, OR PREPARED TO DO ANYTHING TO END YOUR LIFE?: NO
2. HAVE YOU ACTUALLY HAD ANY THOUGHTS OF KILLING YOURSELF?: NO
1. WITHIN THE PAST MONTH, HAVE YOU WISHED YOU WERE DEAD OR WISHED YOU COULD GO TO SLEEP AND NOT WAKE UP?: NO
IS THE PATIENT ABLE TO COMPLETE C-SSRS: YES

## 2022-09-26 ASSESSMENT — PAIN SCALES - GENERAL
PAINLEVEL_OUTOF10: 0
PAINLEVEL_OUTOF10: 0
PAINLEVEL: 0

## 2022-09-26 ASSESSMENT — ACTIVITIES OF DAILY LIVING (ADL)
ADL_SCORE: 12
SENSORY_SUPPORT_DEVICES: DENTURES;EYEGLASSES

## 2022-09-26 ASSESSMENT — COGNITIVE AND FUNCTIONAL STATUS - GENERAL
DO YOU HAVE SERIOUS DIFFICULTY WALKING OR CLIMBING STAIRS: NO
BECAUSE OF A PHYSICAL, MENTAL, OR EMOTIONAL CONDITION, DO YOU HAVE SERIOUS DIFFICULTY CONCENTRATING, REMEMBERING OR MAKING DECISIONS: NO
BECAUSE OF A PHYSICAL, MENTAL, OR EMOTIONAL CONDITION, DO YOU HAVE DIFFICULTY DOING ERRANDS ALONE: NO

## 2022-09-27 ENCOUNTER — APPOINTMENT (OUTPATIENT)
Dept: CARDIOLOGY | Age: 72
End: 2022-09-27
Attending: INTERNAL MEDICINE

## 2022-09-27 VITALS
WEIGHT: 112.88 LBS | TEMPERATURE: 97.5 F | RESPIRATION RATE: 18 BRPM | HEIGHT: 62 IN | SYSTOLIC BLOOD PRESSURE: 144 MMHG | BODY MASS INDEX: 20.77 KG/M2 | OXYGEN SATURATION: 100 % | DIASTOLIC BLOOD PRESSURE: 74 MMHG | HEART RATE: 62 BPM

## 2022-09-27 LAB
ANION GAP SERPL CALC-SCNC: 10 MMOL/L (ref 7–19)
AORTIC VALVE AREA: 2.23 CMÂ²
ASCENDING AORTA (AAD): 2.96 CM
AV MEAN GRADIENT (AVMG): 4.59 MMHG
AV PEAK GRADIENT (AVPG): 8.06 MMHG
AV PEAK VELOCITY (AVPV): 1.4 M/S
AVI LVOT PEAK GRADIENT (LVOTMG): 2.22 MMHG
BASOPHILS # BLD: 0 K/MCL (ref 0–0.3)
BASOPHILS NFR BLD: 0 %
BUN SERPL-MCNC: 23 MG/DL (ref 6–20)
BUN/CREAT SERPL: 14 (ref 7–25)
CALCIUM SERPL-MCNC: 9.3 MG/DL (ref 8.4–10.2)
CHLORIDE SERPL-SCNC: 113 MMOL/L (ref 97–110)
CO2 SERPL-SCNC: 23 MMOL/L (ref 21–32)
CREAT SERPL-MCNC: 1.67 MG/DL (ref 0.51–0.95)
DEPRECATED RDW RBC: 47.8 FL (ref 39–50)
DOP CALC LVOT PEAK VEL (LVOTPV): 1 M/S
E WAVE DECELARATION TIME (MDT): 0.17 S
EOSINOPHIL # BLD: 0.1 K/MCL (ref 0–0.5)
EOSINOPHIL NFR BLD: 3 %
ERYTHROCYTE [DISTWIDTH] IN BLOOD: 15.4 % (ref 11–15)
EST RIGHT VENT SYSTOLIC PRESSURE BY TRICUSPID REGURGITATION JET (RVSP): 25.73 MMHG
FASTING DURATION TIME PATIENT: ABNORMAL H
GFR SERPLBLD BASED ON 1.73 SQ M-ARVRAT: 32 ML/MIN
GLUCOSE SERPL-MCNC: 88 MG/DL (ref 70–99)
HCT VFR BLD CALC: 26.6 % (ref 36–46.5)
HGB BLD-MCNC: 8 G/DL (ref 12–15.5)
IMM GRANULOCYTES # BLD AUTO: 0 K/MCL (ref 0–0.2)
IMM GRANULOCYTES # BLD: 0 %
INTERVENTRICULAR SEPTUM IN END DIASTOLE (IVSD): 1.03 CM
LEFT INTERNAL DIMENSION IN SYSTOLE (LVSD): 2.54 CM
LEFT VENTRICULAR INTERNAL DIMENSION IN DIASTOLE (LVDD): 4.22 CM
LEFT VENTRICULAR POSTERIOR WALL IN END DIASTOLE (LVPW): 0.9 CM
LV EF: 73 %
LV END SYSTOLIC LONGITUDINAL STRAIN GLOBAL (LVGS): -18.4 %
LVOT 2D (LVOTD): 1.98 CM
LVOT VTI (LVOTVTI): 22.26 CM
LYMPHOCYTES # BLD: 0.8 K/MCL (ref 1–4)
LYMPHOCYTES NFR BLD: 19 %
MAGNESIUM SERPL-MCNC: 1.8 MG/DL (ref 1.7–2.4)
MCH RBC QN AUTO: 25.8 PG (ref 26–34)
MCHC RBC AUTO-ENTMCNC: 30.1 G/DL (ref 32–36.5)
MCV RBC AUTO: 85.8 FL (ref 78–100)
MONOCYTES # BLD: 0.3 K/MCL (ref 0.3–0.9)
MONOCYTES NFR BLD: 8 %
MV E TISSUE VEL LAT (MELV): 0.1 M/S
MV E TISSUE VEL MED (MESV): 0 M/S
MV E WAVE VEL/E TISSUE VEL MED(MSR): 14.99 UNITLESS
MV PEAK A VELOCITY (MVPAV): 0.3 M/S
MV PEAK E VELOCITY (MVPEV): 1 M/S
NEUTROPHILS # BLD: 3 K/MCL (ref 1.8–7.7)
NEUTROPHILS NFR BLD: 70 %
NRBC BLD MANUAL-RTO: 0 /100 WBC
PLATELET # BLD AUTO: 211 K/MCL (ref 140–450)
POTASSIUM SERPL-SCNC: 4.3 MMOL/L (ref 3.4–5.1)
PV PEAK VELOCITY (PVPV): 0.7 M/S
RAINBOW EXTRA TUBES HOLD SPECIMEN: NORMAL
RBC # BLD: 3.1 MIL/MCL (ref 4–5.2)
RV END SYSTOLIC LONGITUDINAL STRAIN FREE WALL (RVGS): -26 %
SODIUM SERPL-SCNC: 142 MMOL/L (ref 135–145)
TRICUSPID ANNULAR PLANE SYSTOLIC EXCURSION (TAPSE): 1.56 CM
TRICUSPID VALVE ANNULAR PEAK VELOCITY (TVAPV): 0 M/S
TRICUSPID VALVE PEAK REGURGITATION VELOCITY (TRPV): 2.3 M/S
TV ESTIMATED RIGHT ARTERIAL PRESSURE (RAP): 3 MMHG
WBC # BLD: 4.3 K/MCL (ref 4.2–11)

## 2022-09-27 PROCEDURE — 85025 COMPLETE CBC W/AUTO DIFF WBC: CPT | Performed by: INTERNAL MEDICINE

## 2022-09-27 PROCEDURE — 10002803 HB RX 637: Performed by: HOSPITALIST

## 2022-09-27 PROCEDURE — 99220 INITIAL OBSERVATION CARE,LEVL III: CPT | Performed by: HOSPITALIST

## 2022-09-27 PROCEDURE — 10004651 HB RX, NO CHARGE ITEM: Performed by: STUDENT IN AN ORGANIZED HEALTH CARE EDUCATION/TRAINING PROGRAM

## 2022-09-27 PROCEDURE — 36430 TRANSFUSION BLD/BLD COMPNT: CPT

## 2022-09-27 PROCEDURE — 83735 ASSAY OF MAGNESIUM: CPT | Performed by: STUDENT IN AN ORGANIZED HEALTH CARE EDUCATION/TRAINING PROGRAM

## 2022-09-27 PROCEDURE — 10002803 HB RX 637: Performed by: INTERNAL MEDICINE

## 2022-09-27 PROCEDURE — 80048 BASIC METABOLIC PNL TOTAL CA: CPT | Performed by: STUDENT IN AN ORGANIZED HEALTH CARE EDUCATION/TRAINING PROGRAM

## 2022-09-27 PROCEDURE — 10002803 HB RX 637: Performed by: STUDENT IN AN ORGANIZED HEALTH CARE EDUCATION/TRAINING PROGRAM

## 2022-09-27 PROCEDURE — G0378 HOSPITAL OBSERVATION PER HR: HCPCS

## 2022-09-27 PROCEDURE — 93280 PM DEVICE PROGR EVAL DUAL: CPT

## 2022-09-27 PROCEDURE — 99217 OBSERVATION CARE DISCHARGE: CPT | Performed by: INTERNAL MEDICINE

## 2022-09-27 PROCEDURE — 93280 PM DEVICE PROGR EVAL DUAL: CPT | Performed by: INTERNAL MEDICINE

## 2022-09-27 PROCEDURE — 36415 COLL VENOUS BLD VENIPUNCTURE: CPT | Performed by: STUDENT IN AN ORGANIZED HEALTH CARE EDUCATION/TRAINING PROGRAM

## 2022-09-27 RX ORDER — PANTOPRAZOLE SODIUM 40 MG/1
40 TABLET, DELAYED RELEASE ORAL EVERY 12 HOURS SCHEDULED
Qty: 60 TABLET | Refills: 0 | Status: SHIPPED | OUTPATIENT
Start: 2022-09-27 | End: 2022-10-27

## 2022-09-27 RX ORDER — NIFEDIPINE 30 MG/1
30 TABLET, EXTENDED RELEASE ORAL NIGHTLY
Status: DISCONTINUED | OUTPATIENT
Start: 2022-09-27 | End: 2022-09-27 | Stop reason: HOSPADM

## 2022-09-27 RX ORDER — CYCLOSPORINE 25 MG/1
50 CAPSULE, LIQUID FILLED ORAL
Status: DISCONTINUED | OUTPATIENT
Start: 2022-09-27 | End: 2022-09-27 | Stop reason: HOSPADM

## 2022-09-27 RX ORDER — NIFEDIPINE 30 MG/1
30 TABLET, EXTENDED RELEASE ORAL DAILY
Status: DISCONTINUED | OUTPATIENT
Start: 2022-09-27 | End: 2022-09-27 | Stop reason: HOSPADM

## 2022-09-27 RX ORDER — CYCLOSPORINE 25 MG/1
50 CAPSULE, LIQUID FILLED ORAL
Status: DISCONTINUED | OUTPATIENT
Start: 2022-09-27 | End: 2022-09-27 | Stop reason: SDUPTHER

## 2022-09-27 RX ADMIN — NIFEDIPINE 30 MG: 30 TABLET, FILM COATED, EXTENDED RELEASE ORAL at 07:58

## 2022-09-27 RX ADMIN — CYCLOSPORINE 50 MG: 25 CAPSULE, LIQUID FILLED ORAL at 08:11

## 2022-09-27 RX ADMIN — MYCOPHENOLATE MOFETIL 500 MG: 500 TABLET, FILM COATED ORAL at 07:58

## 2022-09-27 RX ADMIN — PREDNISONE 2.5 MG: 2.5 TABLET ORAL at 12:04

## 2022-09-27 RX ADMIN — ASPIRIN 81 MG: 81 TABLET, COATED ORAL at 07:58

## 2022-09-27 RX ADMIN — SODIUM CHLORIDE, PRESERVATIVE FREE 2 ML: 5 INJECTION INTRAVENOUS at 07:59

## 2022-09-27 RX ADMIN — LEVOTHYROXINE SODIUM 75 MCG: 0.07 TABLET ORAL at 05:52

## 2022-09-27 RX ADMIN — PANTOPRAZOLE SODIUM 40 MG: 40 TABLET, DELAYED RELEASE ORAL at 07:57

## 2022-09-27 ASSESSMENT — PAIN SCALES - GENERAL
PAINLEVEL_OUTOF10: 0

## 2022-09-28 ENCOUNTER — CLINICAL ABSTRACT (OUTPATIENT)
Dept: TRANSPLANT | Age: 72
End: 2022-09-28

## 2022-09-28 RX ORDER — LEVOTHYROXINE SODIUM 0.05 MG/1
50 TABLET ORAL DAILY
Qty: 90 TABLET | Refills: 3 | Status: SHIPPED | OUTPATIENT
Start: 2022-09-28

## 2022-09-28 RX ORDER — LEVOTHYROXINE SODIUM 0.05 MG/1
50 TABLET ORAL DAILY
COMMUNITY
End: 2022-09-28 | Stop reason: DRUGHIGH

## 2022-09-29 ASSESSMENT — ACTIVITIES OF DAILY LIVING (ADL)
ADL_SCORE: 12
CHRONIC_PAIN_PRESENT: NO
HISTORY OF FALLING IN THE LAST YEAR (PRIOR TO ADMISSION): NO
ADL_SHORT_OF_BREATH: NO
NEEDS_ASSIST: NO
SENSORY_SUPPORT_DEVICES: EYEGLASSES;DENTURES
RECENT_DECLINE_ADL: NO
ADL_BEFORE_ADMISSION: INDEPENDENT

## 2022-10-01 ENCOUNTER — LAB SERVICES (OUTPATIENT)
Dept: LAB | Age: 72
End: 2022-10-01
Attending: INTERNAL MEDICINE

## 2022-10-01 DIAGNOSIS — Z01.812 ENCOUNTER FOR PREPROCEDURE SCREENING LABORATORY TESTING FOR COVID-19: ICD-10-CM

## 2022-10-01 DIAGNOSIS — Z11.52 ENCOUNTER FOR PREPROCEDURE SCREENING LABORATORY TESTING FOR COVID-19: ICD-10-CM

## 2022-10-01 PROCEDURE — U0005 INFEC AGEN DETEC AMPLI PROBE: HCPCS

## 2022-10-02 LAB
SARS-COV-2 RNA RESP QL NAA+PROBE: NOT DETECTED
SERVICE CMNT-IMP: NORMAL
SERVICE CMNT-IMP: NORMAL

## 2022-10-03 ENCOUNTER — ANESTHESIA EVENT (OUTPATIENT)
Dept: SURGERY | Age: 72
End: 2022-10-03

## 2022-10-04 ENCOUNTER — ANESTHESIA (OUTPATIENT)
Dept: SURGERY | Age: 72
End: 2022-10-04

## 2022-10-04 ENCOUNTER — HOSPITAL ENCOUNTER (OUTPATIENT)
Age: 72
Discharge: HOME | End: 2022-10-04
Attending: INTERNAL MEDICINE | Admitting: INTERNAL MEDICINE

## 2022-10-04 VITALS
HEIGHT: 62 IN | WEIGHT: 114.8 LBS | OXYGEN SATURATION: 100 % | SYSTOLIC BLOOD PRESSURE: 168 MMHG | DIASTOLIC BLOOD PRESSURE: 85 MMHG | BODY MASS INDEX: 21.12 KG/M2 | RESPIRATION RATE: 19 BRPM | TEMPERATURE: 97 F | HEART RATE: 70 BPM

## 2022-10-04 PROCEDURE — 10002801 HB RX 250 W/O HCPCS: Performed by: INTERNAL MEDICINE

## 2022-10-04 PROCEDURE — 10001568 HB ANESTH MAC IV ADD'L 1/2 HR: Performed by: INTERNAL MEDICINE

## 2022-10-04 PROCEDURE — 10002362 HB ANESTH MAC IV 1ST 1/2 HR: Performed by: INTERNAL MEDICINE

## 2022-10-04 PROCEDURE — 10002800 HB RX 250 W HCPCS: Performed by: ANESTHESIOLOGY

## 2022-10-04 PROCEDURE — A45378 ANES COLONOSCOPY FLEX PROX SPLEN FLEX D: Performed by: ANESTHESIOLOGY

## 2022-10-04 PROCEDURE — 10003428 HB COLONOSCOPY: Performed by: INTERNAL MEDICINE

## 2022-10-04 PROCEDURE — 10003436 HB UPPER GI ENDOSCOPY: Performed by: INTERNAL MEDICINE

## 2022-10-04 RX ORDER — LIDOCAINE HYDROCHLORIDE 40 MG/ML
SOLUTION TOPICAL PRN
Status: DISCONTINUED | OUTPATIENT
Start: 2022-10-04 | End: 2022-10-04 | Stop reason: HOSPADM

## 2022-10-04 RX ORDER — NALOXONE HCL 0.4 MG/ML
0.2 VIAL (ML) INJECTION EVERY 5 MIN PRN
Status: DISCONTINUED | OUTPATIENT
Start: 2022-10-04 | End: 2022-10-04 | Stop reason: HOSPADM

## 2022-10-04 RX ORDER — 0.9 % SODIUM CHLORIDE 0.9 %
2 VIAL (ML) INJECTION EVERY 12 HOURS SCHEDULED
Status: DISCONTINUED | OUTPATIENT
Start: 2022-10-04 | End: 2022-10-04 | Stop reason: HOSPADM

## 2022-10-04 RX ORDER — DEXTROSE MONOHYDRATE 50 MG/ML
INJECTION, SOLUTION INTRAVENOUS CONTINUOUS PRN
Status: DISCONTINUED | OUTPATIENT
Start: 2022-10-04 | End: 2022-10-04 | Stop reason: HOSPADM

## 2022-10-04 RX ORDER — DEXTROSE MONOHYDRATE 25 G/50ML
25 INJECTION, SOLUTION INTRAVENOUS PRN
Status: DISCONTINUED | OUTPATIENT
Start: 2022-10-04 | End: 2022-10-04 | Stop reason: HOSPADM

## 2022-10-04 RX ORDER — CLOPIDOGREL BISULFATE 75 MG/1
300 TABLET ORAL
COMMUNITY

## 2022-10-04 RX ORDER — SODIUM CHLORIDE 9 MG/ML
INJECTION, SOLUTION INTRAVENOUS CONTINUOUS
Status: DISCONTINUED | OUTPATIENT
Start: 2022-10-04 | End: 2022-10-04 | Stop reason: HOSPADM

## 2022-10-04 RX ORDER — HUMAN INSULIN 100 [IU]/ML
INJECTION, SOLUTION SUBCUTANEOUS
Status: DISCONTINUED | OUTPATIENT
Start: 2022-10-04 | End: 2022-10-04 | Stop reason: HOSPADM

## 2022-10-04 RX ORDER — SODIUM CHLORIDE, SODIUM LACTATE, POTASSIUM CHLORIDE, CALCIUM CHLORIDE 600; 310; 30; 20 MG/100ML; MG/100ML; MG/100ML; MG/100ML
INJECTION, SOLUTION INTRAVENOUS CONTINUOUS
Status: DISCONTINUED | OUTPATIENT
Start: 2022-10-04 | End: 2022-10-04 | Stop reason: HOSPADM

## 2022-10-04 RX ORDER — PROPOFOL 10 MG/ML
INJECTION, EMULSION INTRAVENOUS PRN
Status: DISCONTINUED | OUTPATIENT
Start: 2022-10-04 | End: 2022-10-04

## 2022-10-04 RX ORDER — NICOTINE POLACRILEX 4 MG
30 LOZENGE BUCCAL
Status: DISCONTINUED | OUTPATIENT
Start: 2022-10-04 | End: 2022-10-04 | Stop reason: HOSPADM

## 2022-10-04 RX ORDER — LIDOCAINE HYDROCHLORIDE 10 MG/ML
5-10 INJECTION, SOLUTION INFILTRATION; PERINEURAL PRN
Status: DISCONTINUED | OUTPATIENT
Start: 2022-10-04 | End: 2022-10-04 | Stop reason: HOSPADM

## 2022-10-04 RX ADMIN — PROPOFOL 100 MCG/KG/MIN: 10 INJECTION, EMULSION INTRAVENOUS at 10:53

## 2022-10-04 RX ADMIN — PROPOFOL 50 MG: 10 INJECTION, EMULSION INTRAVENOUS at 10:56

## 2022-10-04 SDOH — SOCIAL STABILITY: SOCIAL INSECURITY: RISK FACTORS: HEART DISEASE

## 2022-10-04 SDOH — SOCIAL STABILITY: SOCIAL INSECURITY: RISK FACTORS: HEMATOLOGICAL DISEASE

## 2022-10-05 LAB — REPORT TEXT: NORMAL

## 2022-10-21 RX ORDER — MYCOPHENOLATE MOFETIL 500 MG/1
500 TABLET ORAL 2 TIMES DAILY
Qty: 180 TABLET | Refills: 3 | Status: SHIPPED | OUTPATIENT
Start: 2022-10-21

## 2022-10-28 ENCOUNTER — TELEPHONE (OUTPATIENT)
Dept: TRANSPLANT | Age: 72
End: 2022-10-28

## 2022-11-07 ENCOUNTER — TELEPHONE (OUTPATIENT)
Dept: TRANSPLANT | Age: 72
End: 2022-11-07

## 2022-11-08 ENCOUNTER — TELEPHONE (OUTPATIENT)
Dept: CARDIOLOGY | Age: 72
End: 2022-11-08

## 2022-11-10 ENCOUNTER — TELEPHONE (OUTPATIENT)
Dept: TRANSPLANT | Age: 72
End: 2022-11-10

## 2022-11-29 ENCOUNTER — TELEPHONE (OUTPATIENT)
Dept: TRANSPLANT | Age: 72
End: 2022-11-29

## 2022-12-02 ENCOUNTER — TELEPHONE (OUTPATIENT)
Dept: TRANSPLANT | Age: 72
End: 2022-12-02

## 2023-01-01 ENCOUNTER — EXTERNAL RECORD (OUTPATIENT)
Dept: OTHER | Age: 73
End: 2023-01-01

## 2023-01-04 ENCOUNTER — APPOINTMENT (OUTPATIENT)
Dept: ELECTROPHYSIOLOGY | Age: 73
End: 2023-01-04

## 2023-01-13 ENCOUNTER — TELEPHONE (OUTPATIENT)
Dept: TRANSPLANT | Age: 73
End: 2023-01-13

## 2023-01-24 RX ORDER — CYCLOSPORINE 25 MG/1
50 CAPSULE ORAL 2 TIMES DAILY
Qty: 360 CAPSULE | Refills: 3 | OUTPATIENT
Start: 2023-01-24

## 2023-01-31 RX ORDER — CYCLOSPORINE 25 MG/1
50 CAPSULE ORAL 2 TIMES DAILY
Qty: 360 CAPSULE | Refills: 3 | Status: SHIPPED | OUTPATIENT
Start: 2023-01-31

## 2023-04-24 RX ORDER — PREDNISONE 2.5 MG/1
2.5 TABLET ORAL
Qty: 90 TABLET | Refills: 3 | Status: SHIPPED | OUTPATIENT
Start: 2023-04-24

## 2023-05-01 DIAGNOSIS — Z94.1 HEART TRANSPLANT RECIPIENT (CMD): Primary | ICD-10-CM

## 2023-06-30 ENCOUNTER — EXTERNAL RECORD (OUTPATIENT)
Dept: OTHER | Age: 73
End: 2023-06-30

## 2023-07-26 ENCOUNTER — TELEPHONE (OUTPATIENT)
Dept: TRANSPLANT | Age: 73
End: 2023-07-26

## 2023-09-07 ENCOUNTER — TELEPHONE (OUTPATIENT)
Dept: TRANSPLANT | Age: 73
End: 2023-09-07

## 2023-09-07 DIAGNOSIS — Z94.1 HEART TRANSPLANT RECIPIENT (CMD): Primary | ICD-10-CM

## 2023-09-12 ENCOUNTER — TELEPHONE (OUTPATIENT)
Dept: TRANSPLANT | Age: 73
End: 2023-09-12

## 2023-09-12 DIAGNOSIS — Z94.1 HEART TRANSPLANT RECIPIENT (CMD): Primary | ICD-10-CM

## 2023-09-12 DIAGNOSIS — Z79.899 OTHER LONG TERM (CURRENT) DRUG THERAPY: ICD-10-CM

## 2023-09-12 DIAGNOSIS — Z12.31 ENCOUNTER FOR SCREENING MAMMOGRAM FOR MALIGNANT NEOPLASM OF BREAST: ICD-10-CM

## 2023-09-13 RX ORDER — MYCOPHENOLATE MOFETIL 250 MG/1
250 CAPSULE ORAL 2 TIMES DAILY
COMMUNITY

## 2023-09-15 LAB
ALBUMIN SERPL-MCNC: 3.8 G/DL
ALBUMIN/GLOB SERPL: 0.9 {RATIO}
ALP SERPL-CCNC: 82 U/L
ALT SERPL-CCNC: 21 UNITS/L
ANION GAP SERPL CALC-SCNC: 14.9 MMOL/L
AST SERPL-CCNC: 17 UNITS/L
BASOPHILS # BLD: 0.4 10*3/UL
BILIRUB SERPL-MCNC: 0.4 MG/DL
BUN/CREAT SERPL: 18
CALCIUM SERPL-MCNC: 9.6 MG/DL
CHLORIDE SERPL-SCNC: 106 MMOL/L
CHOL/HDL RATIO: 2.21
CHOLESTEROL: 192
CMV PCR: <250
CO2 SERPL-SCNC: 23 MMOL/L
CYCLOSPORINE BLD-MCNC: 63 NG/ML
EOSINOPHIL # BLD: 1.2 10*3/UL
GLOBULIN SER-MCNC: 4.1 G/DL
GLUCOSE SERPL-MCNC: 80 MG/DL
HBA1C MFR BLD: 5.5 %
HBV SURFACE AG SER QL: NEGATIVE
HCT VFR BLD CALC: 32.6 %
HDLC SERPL-MCNC: 87 MG/DL
HGB BLD-MCNC: 9.9 G/DL
LDLC SERPL CALC-MCNC: 83 MG/DL (ref ?–129)
LYMPHOCYTES # BLD: 19.9 10*3/UL
LYMPHOCYTES NFR BLD: 0.8 %
Lab: <2.5
MAGNESIUM SERPL-MCNC: 2.2 MG/DL
MCH RBC QN AUTO: 25.7 PG
MCHC RBC AUTO-ENTMCNC: 30.5 G/DL
MCV RBC AUTO: 84.2 FL
MONOCYTES # BLD: 5.6 10*3/UL
MONOCYTES NFR BLD: 0.2 %
MPV (OFFPRE2): 8.7
NEUTROPHILS # BLD: 72.9 10*3/UL
NEUTROPHILS NFR BLD: 2.9 %
PLATELET # BLD: 178 K/MCL
POTASSIUM SERPL-SCNC: 4.9 MMOL/L
PROT SERPL-MCNC: 7.9 G/DL
RBC # BLD: 3.87 10*6/UL
SERVICE CMNT-IMP: NOT DETECTED
SODIUM SERPL-SCNC: 139 MMOL/L
TRIGLYCERIDE: 109
TSH SERPL-ACNC: 2.72 MCUNITS/ML
URATE SERPL-MCNC: 6.1 MG/DL
VLDL CHOLESTEROL: 22
WBC # BLD: 3.9 K/MCL

## 2023-10-17 RX ORDER — LEVOTHYROXINE SODIUM 0.05 MG/1
50 TABLET ORAL DAILY
Qty: 90 TABLET | Refills: 3 | OUTPATIENT
Start: 2023-10-17

## 2023-12-27 ENCOUNTER — CLINICAL DOCUMENTATION (OUTPATIENT)
Dept: TRANSPLANT | Age: 73
End: 2023-12-27

## 2024-01-11 ENCOUNTER — TELEPHONE (OUTPATIENT)
Dept: TRANSPLANT | Age: 74
End: 2024-01-11

## 2024-01-26 ENCOUNTER — CLINICAL ABSTRACT (OUTPATIENT)
Dept: TRANSPLANT | Age: 74
End: 2024-01-26

## 2024-07-15 ENCOUNTER — HOSPITAL ENCOUNTER (OUTPATIENT)
Facility: HOSPITAL | Age: 74
Setting detail: RECURRING SERIES
Discharge: HOME | End: 2024-07-18
Payer: MEDICARE

## 2024-07-15 PROCEDURE — 97535 SELF CARE MNGMENT TRAINING: CPT

## 2024-07-15 PROCEDURE — 97162 PT EVAL MOD COMPLEX 30 MIN: CPT

## 2024-07-15 PROCEDURE — 97110 THERAPEUTIC EXERCISES: CPT

## 2024-07-15 NOTE — THERAPY EVALUATION
[facility]  [address] [address] Ph:[phone] Fx: [phone]  Plan of Care / Statement of Necessity for Physical Therapy Services     Patient Name: Estefania Lund : 1950   Medical   Diagnosis: Right hip pain [M25.551] Treatment Diagnosis: M25.551  RIGHT HIP PAIN and M25.552  LEFT HIP PAIN       Onset Date: Aggravated ~ 6 months  Payor :  Payor: MEDICARE / Plan: MEDICARE PART A AND B / Product Type: *No Product type* /    Referral Source: Ji Schuler Jr., MD Start of Care (SOC): 7/15/2024   Prior Hospitalization: See medical history Provider #: [id number]   Prior Level of Function: working as  for back; lives in 2 story home with ; step over tub with grab bar; enjoys cross stitching and crafting   Comorbidities:  back pain, arthritis/DDD, fibromyalgia, HTN, history of breast cancer, heart disease/high cholesterol; history of B TKA     Assessment / key information: Patient is a 74-year-old right-handed female who presents to therapy with chief complaint of right hip pain. Patient reports pain limits sleeping >2-3 hours and prevents her from finding a comfortable sleeping position. Pain will radiate to right foot. Symptoms localized to posterolateral hip but will radiate around joint line. She has to navigate stairs with step to pattern with at least 1 handrail, has difficulty entering/exiting tub and dressing, and has difficulty with floor transfers. Symptoms aggravated with cold, rainy weather, standing in one position, sleeping, and PM/waking; symptoms reduced with heat and medication. Exam reveals patient with decreased hip AROM B as well as decreased B hip strength (right side weaker). TTP noted to right ITB, greater trochanter, and glute med/min. KIT reproduced pain to greater trochanter on left while FADIR positive on left; unable to formally assess on right due to patient with increased pain with

## 2024-07-15 NOTE — PROGRESS NOTES
PT DAILY TREATMENT NOTE/HIP [id number]      Patient Name: Estefania Lund    Date: 7/15/2024    : 1950  Insurance: Payor: MEDICARE / Plan: MEDICARE PART A AND B / Product Type: *No Product type* /      Patient  verified yes     Visit #   Current / Total 1 24   Time   In / Out 3:27P 4:09P   Pain   In / Out 5/10 5/10   Subjective Functional Status/Changes: See poc     Treatment Area: Right hip pain [M25.551]    SUBJECTIVE  Pain Level (0-10 scale): at worst: 10/10, at best: 4/10   []constant []intermittent []improving []worsening []no change since onset    Any medication changes, allergies to medications, adverse drug reactions, diagnosis change, or new procedure performed?: [x] No    [] Yes (see summary sheet for update)  Subjective functional status/changes:       Prior Level of Function: working as  for [employer]; lives in 2 story home with ; step over tub with grab bar; enjoys cross stitching and crafting   Comorbidities:  back pain, arthritis/DDD, fibromyalgia, HTN, history of breast cancer, heart disease/high cholesterol; history of B TKA     Assessment / key information: Patient is a 74-year-old right-handed female who presents to therapy with chief complaint of right hip pain. Patient reports pain limits sleeping >2-3 hours and prevents her from finding a comfortable sleeping position. Pain will radiate to right foot. Symptoms localized to posterolateral hip but will radiate around joint line. She has to navigate stairs with step to pattern with at least 1 handrail, has difficulty entering/exiting tub and dressing, and has difficulty with floor transfers. Symptoms aggravated with cold, rainy weather, standing in one position, sleeping, and PM/waking; symptoms reduced with heat and medication. Exam reveals patient with decreased hip AROM B as well as decreased B hip strength (right side weaker). TTP noted to right ITB, greater trochanter, and glute med/min. KIT reproduced pain to greater

## 2024-07-16 ENCOUNTER — HOSPITAL ENCOUNTER (OUTPATIENT)
Facility: HOSPITAL | Age: 74
Setting detail: RECURRING SERIES
Discharge: HOME | End: 2024-07-19
Payer: MEDICARE

## 2024-07-16 PROCEDURE — 97110 THERAPEUTIC EXERCISES: CPT

## 2024-07-16 PROCEDURE — 97530 THERAPEUTIC ACTIVITIES: CPT

## 2024-07-16 PROCEDURE — 97112 NEUROMUSCULAR REEDUCATION: CPT

## 2024-07-16 NOTE — PROGRESS NOTES
PHYSICAL / OCCUPATIONAL THERAPY - DAILY TREATMENT NOTE    Patient Name: Estefania Lund    Date: 2024    : 1950  Insurance: Payor: MEDICARE / Plan: MEDICARE PART A AND B / Product Type: *No Product type* /      Patient  verified Yes     Visit #   Current / Total 2 24   Time   In / Out 12:00 12:01   Pain   In / Out 6/10 6/10   Subjective Functional Status/Changes: Pt reports doing ok. Pain is always there. She thinks she can do the bike.      TREATMENT AREA =  Right hip pain [M25.551]     OBJECTIVE    Therapeutic Procedures:    Tx Min Billable or 1:1 Min (if diff from Tx Min) Procedure, Rationale, Specifics   23 20 04157 Therapeutic Exercise (timed):  increase ROM, strength, coordination, balance, and proprioception to improve patient's ability to progress to PLOF and address remaining functional goals. (see flow sheet as applicable)     Details if applicable:       8 8 45464 Neuromuscular Re-Education (timed):  improve balance, coordination, kinesthetic sense, posture, core stability and proprioception to improve patient's ability to develop conscious control of individual muscles and awareness of position of extremities in order to progress to PLOF and address remaining functional goals. (see flow sheet as applicable)     Details if applicable:     10 10 56058 Manual Therapy (timed):  decrease pain, increase ROM, increase tissue extensibility, and decrease trigger points to improve patient's ability to progress to PLOF and address remaining functional goals.  The manual therapy interventions were performed at a separate and distinct time from the therapeutic activities interventions . (see flow sheet as applicable)     Details if applicable:  rolling stick for Right ITB, lat quad, glute, TFL.   41 38 [facility] Totals Reminder: bill using total billable min of TIMED therapeutic procedures (example: do not include dry needle or estim unattended, both untimed codes, in totals to left)  8-22 min = 1 unit;

## 2024-07-30 ENCOUNTER — APPOINTMENT (OUTPATIENT)
Facility: HOSPITAL | Age: 74
End: 2024-07-30
Payer: MEDICARE

## 2024-07-31 ENCOUNTER — APPOINTMENT (OUTPATIENT)
Facility: HOSPITAL | Age: 74
End: 2024-07-31
Payer: MEDICARE

## 2024-08-12 ENCOUNTER — HOSPITAL ENCOUNTER (OUTPATIENT)
Facility: HOSPITAL | Age: 74
Setting detail: RECURRING SERIES
Discharge: HOME | End: 2024-08-15
Payer: MEDICARE

## 2024-08-12 PROCEDURE — 97535 SELF CARE MNGMENT TRAINING: CPT

## 2024-08-12 PROCEDURE — 97530 THERAPEUTIC ACTIVITIES: CPT

## 2024-08-12 NOTE — THERAPY DISCHARGE
[facility] - [facility]  [address] [place] [address]  [phone] fax    Physical Therapy Discharge Summary    Patient name: Estefania Lund Start of Care: 7/15/2024    Referral source: Ji Schuler Jr., MD : 1950   Medical/Treatment Diagnosis: Right hip pain [M25.551]  Payor: MEDICARE / Plan: MEDICARE PART A AND B / Product Type: *No Product type* /  Onset Date:Aggravated ~ 6 months      Prior Hospitalization: see medical history Provider#: [id number]   Comorbidities: back pain, arthritis/DDD, fibromyalgia, HTN, history of breast cancer, heart disease/high cholesterol; history of B TKA   Prior Level of Function:working as  for back; lives in 2 story home with ; step over tub with grab bar; enjoys cross stitching and crafting     Visits from Start of Care: 3    Missed Visits: 0    Reporting Period : 7/15/24 to 24    Summary of Care:  Short Term Goals: To be accomplished in 4 weeks  Patient will report compliance with initial HEP to optimize therapy outcomes.  Status at evaluation: established  NOT MET-fair compliance 24  2. Patient will improve LEFS score by at least 5 points in order to demonstrate functional improvement.              Status at evaluation: 33/80  MET-45/80 24  3. Patient will report no greater than 7/10 pain to right hip in order to perform daily tasks with increased ease.              Status at evaluation: 10/10  NOT MET-8/10 24  4. Patient will improve B hip flex AROM to at least 100 deg in order to enter/exit tub with increased ease.              Status at evaluation: left 86 deg, right 91 deg  NOT MET-left 91 deg, right 90 deg 24  5. Patient will report sleeping tolerance of at least 5 consecutive hours in order to improve overall QOL.              Status at evaluation: 2-3 hours  NOT MET-2 hours 24     Long Term Goals: To be accomplished in 8 weeks  Patient will improve LEFS score by at

## 2024-08-12 NOTE — PROGRESS NOTES
PHYSICAL / OCCUPATIONAL THERAPY - DAILY TREATMENT NOTE    Patient Name: Estefania Lund    Date: 2024    : 1950  Insurance: Payor: MEDICARE / Plan: MEDICARE PART A AND B / Product Type: *No Product type* /      Patient  verified yes   Visit #   Current / Total 3 24   Time   In / Out 1:23P 2:02P   Pain   In / Out 6/10 6/10   Subjective Functional Status/Changes: Patient reports she had bronchitis so really was just able to work. She works on stretches when she can. She is able to get her leg up over the tub now and most days can get dressed without laying her pants on the floor. She is going away for a time and has to work. She has all the equipment at home. Stairs have been ok; she can go step over step. Getting up and down from the floor is still not great due to her hips and back (spinal stenosis). She will use a low seat with floor transfers. She has the most pain with sleeping. She still has to change positions frequently. She has done the stick roll massage.      TREATMENT AREA =  Right hip pain [M25.551]     OBJECTIVE      Therapeutic Procedures:    Tx Min Billable or 1:1 Min (if diff from Tx Min) Procedure, Rationale, Specifics   26  85737 Therapeutic Activity (timed):  use of dynamic activities replicating functional movements to increase ROM, strength, coordination, balance, and proprioception in order to improve patient's ability to progress to PLOF and address remaining functional goals.  (see flow sheet as applicable)     Details if applicable:  goal assessment     13  51323 Self Care/Home Management (timed):  improve patient knowledge and understanding of pain reducing techniques, positioning, posture/ergonomics, home safety, activity modification, diagnosis/prognosis, and physical therapy expectations, procedures and progression  to improve patient's ability to progress to PLOF and address remaining functional goals.  (see flow sheet as applicable)     Details if applicable:  ability to 
smoke 1 x a week  for 6 years, quit 10/2017/cigarettes

## 2024-08-14 ENCOUNTER — APPOINTMENT (OUTPATIENT)
Facility: HOSPITAL | Age: 74
End: 2024-08-14
Payer: MEDICARE

## 2024-08-16 ENCOUNTER — APPOINTMENT (OUTPATIENT)
Facility: HOSPITAL | Age: 74
End: 2024-08-16
Payer: MEDICARE

## 2024-09-19 ENCOUNTER — HOSPITAL ENCOUNTER (OUTPATIENT)
Facility: HOSPITAL | Age: 74
Discharge: HOME | End: 2024-09-21
Attending: INTERNAL MEDICINE
Payer: MEDICARE

## 2024-09-19 ENCOUNTER — HOSPITAL ENCOUNTER (OUTPATIENT)
Facility: HOSPITAL | Age: 74
Discharge: HOME | End: 2024-09-22
Attending: INTERNAL MEDICINE
Payer: MEDICARE

## 2024-09-19 DIAGNOSIS — R42 DIZZINESS AND GIDDINESS: ICD-10-CM
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
## 2024-09-19 DIAGNOSIS — R51.9 NONINTRACTABLE HEADACHE, UNSPECIFIED CHRONICITY PATTERN, UNSPECIFIED HEADACHE TYPE: ICD-10-CM

## 2024-09-19 LAB — CREAT UR-MCNC: 1 MG/DL (ref 0.6–1.3)

## 2024-09-19 PROCEDURE — 6360000004 HC RX CONTRAST MEDICATION: Performed by: INTERNAL MEDICINE

## 2024-09-19 PROCEDURE — 70470 CT HEAD/BRAIN W/O & W/DYE: CPT

## 2024-09-19 PROCEDURE — 93880 EXTRACRANIAL BILAT STUDY: CPT

## 2024-09-19 PROCEDURE — 82565 ASSAY OF CREATININE: CPT

## 2024-09-19 RX ORDER — IOPAMIDOL 612 MG/ML
80 INJECTION, SOLUTION INTRAVASCULAR
Status: COMPLETED | OUTPATIENT
Start: 2024-09-19 | End: 2024-09-19

## 2024-09-19 RX ADMIN — IOPAMIDOL 80 ML: 612 INJECTION, SOLUTION INTRAVENOUS at 08:08

## 2024-09-20 LAB
VAS LEFT CCA DIST EDV: 23.5 CM/S
VAS LEFT CCA DIST PSV: 98.4 CM/S
VAS LEFT CCA MID EDV: 20.16 CM/S
VAS LEFT CCA MID PSV: 88.47 CM/S
VAS LEFT CCA PROX EDV: 19.1 CM/S
VAS LEFT CCA PROX PSV: 90.7 CM/S
VAS LEFT ECA EDV: 10.25 CM/S
VAS LEFT ECA PSV: 59.8 CM/S
VAS LEFT ICA DIST EDV: 26.8 CM/S
VAS LEFT ICA DIST PSV: 95.1 CM/S
VAS LEFT ICA MID EDV: 20.5 CM/S
VAS LEFT ICA MID PSV: 76.7 CM/S
VAS LEFT ICA PROX EDV: 15 CM/S
VAS LEFT ICA PROX PSV: 67.9 CM/S
VAS LEFT ICA/CCA PSV: 0.97 NO UNITS
VAS LEFT VERTEBRAL EDV: 14.65 CM/S
VAS LEFT VERTEBRAL PSV: 44.4 CM/S
VAS RIGHT CCA DIST EDV: 20.6 CM/S
VAS RIGHT CCA DIST PSV: 84 CM/S
VAS RIGHT CCA MID EDV: 14.11 CM/S
VAS RIGHT CCA MID PSV: 78.82 CM/S
VAS RIGHT CCA PROX EDV: 15.4 CM/S
VAS RIGHT CCA PROX PSV: 59.4 CM/S
VAS RIGHT ECA EDV: 12.04 CM/S
VAS RIGHT ECA PSV: 116.9 CM/S
VAS RIGHT ICA DIST EDV: 22.1 CM/S
VAS RIGHT ICA DIST PSV: 72.6 CM/S
VAS RIGHT ICA MID EDV: 15.1 CM/S
VAS RIGHT ICA MID PSV: 51.2 CM/S
VAS RIGHT ICA PROX EDV: 11 CM/S
VAS RIGHT ICA PROX PSV: 56.1 CM/S
VAS RIGHT ICA/CCA PSV: 0.9 NO UNITS
VAS RIGHT SUBCLAVIAN PROX EDV: 0 CM/S
VAS RIGHT SUBCLAVIAN PROX PSV: 78.9 CM/S
VAS RIGHT VERTEBRAL EDV: 12.48 CM/S
VAS RIGHT VERTEBRAL PSV: 44.8 CM/S

## 2024-10-01 ENCOUNTER — OFFICE VISIT (OUTPATIENT)
Age: 74
End: 2024-10-01
Payer: MEDICARE

## 2024-10-01 VITALS
BODY MASS INDEX: 33.11 KG/M2 | HEART RATE: 69 BPM | SYSTOLIC BLOOD PRESSURE: 124 MMHG | OXYGEN SATURATION: 93 % | WEIGHT: 206 LBS | HEIGHT: 66 IN | DIASTOLIC BLOOD PRESSURE: 72 MMHG

## 2024-10-01 DIAGNOSIS — R55 PRE-SYNCOPE: ICD-10-CM

## 2024-10-01 DIAGNOSIS — I10 PRIMARY HYPERTENSION: ICD-10-CM

## 2024-10-01 DIAGNOSIS — R42 DIZZINESS: Primary | ICD-10-CM

## 2024-10-01 PROBLEM — D05.12 DUCTAL CARCINOMA IN SITU (DCIS) OF LEFT BREAST: Status: ACTIVE | Noted: 2022-06-08

## 2024-10-01 PROBLEM — F32.A DEPRESSION: Status: ACTIVE | Noted: 2024-10-01

## 2024-10-01 PROBLEM — R20.0 BILATERAL HAND NUMBNESS: Status: ACTIVE | Noted: 2024-10-01

## 2024-10-01 PROBLEM — M79.7 FIBROMYALGIA: Status: ACTIVE | Noted: 2024-10-01

## 2024-10-01 PROBLEM — M47.812 CERVICAL SPONDYLOSIS: Status: ACTIVE | Noted: 2024-10-01

## 2024-10-01 PROBLEM — M15.0 PRIMARY GENERALIZED (OSTEO)ARTHRITIS: Status: ACTIVE | Noted: 2024-10-01

## 2024-10-01 PROCEDURE — 1123F ACP DISCUSS/DSCN MKR DOCD: CPT | Performed by: INTERNAL MEDICINE

## 2024-10-01 PROCEDURE — 93000 ELECTROCARDIOGRAM COMPLETE: CPT | Performed by: INTERNAL MEDICINE

## 2024-10-01 PROCEDURE — 3074F SYST BP LT 130 MM HG: CPT | Performed by: INTERNAL MEDICINE

## 2024-10-01 PROCEDURE — 99203 OFFICE O/P NEW LOW 30 MIN: CPT | Performed by: INTERNAL MEDICINE

## 2024-10-01 PROCEDURE — 3078F DIAST BP <80 MM HG: CPT | Performed by: INTERNAL MEDICINE

## 2024-10-01 RX ORDER — CHLORAL HYDRATE 500 MG
CAPSULE ORAL DAILY
COMMUNITY

## 2024-10-01 RX ORDER — SULFAMETHOXAZOLE/TRIMETHOPRIM 800-160 MG
1 TABLET ORAL ONCE
COMMUNITY

## 2024-10-01 RX ORDER — METOPROLOL SUCCINATE 50 MG/1
50 TABLET, EXTENDED RELEASE ORAL DAILY
COMMUNITY

## 2024-10-01 RX ORDER — SODIUM CAPRYLATE
POWDER (GRAM) MISCELLANEOUS DAILY
COMMUNITY

## 2024-10-01 RX ORDER — PSEUDOEPHEDRINE HCL 30 MG
TABLET ORAL
COMMUNITY

## 2024-10-01 RX ORDER — PRAVASTATIN SODIUM 20 MG
20 TABLET ORAL NIGHTLY
COMMUNITY

## 2024-10-01 ASSESSMENT — ENCOUNTER SYMPTOMS
ABDOMINAL PAIN: 0
COUGH: 0
DIARRHEA: 0
VOICE CHANGE: 0
TROUBLE SWALLOWING: 0
CONSTIPATION: 0
BLOOD IN STOOL: 0
SHORTNESS OF BREATH: 0
VOMITING: 0
NAUSEA: 0
SORE THROAT: 0
WHEEZING: 0

## 2024-10-01 ASSESSMENT — PATIENT HEALTH QUESTIONNAIRE - PHQ9
8. MOVING OR SPEAKING SO SLOWLY THAT OTHER PEOPLE COULD HAVE NOTICED. OR THE OPPOSITE, BEING SO FIGETY OR RESTLESS THAT YOU HAVE BEEN MOVING AROUND A LOT MORE THAN USUAL: NOT AT ALL
SUM OF ALL RESPONSES TO PHQ QUESTIONS 1-9: 0
5. POOR APPETITE OR OVEREATING: NOT AT ALL
2. FEELING DOWN, DEPRESSED OR HOPELESS: NOT AT ALL
10. IF YOU CHECKED OFF ANY PROBLEMS, HOW DIFFICULT HAVE THESE PROBLEMS MADE IT FOR YOU TO DO YOUR WORK, TAKE CARE OF THINGS AT HOME, OR GET ALONG WITH OTHER PEOPLE: NOT DIFFICULT AT ALL
SUM OF ALL RESPONSES TO PHQ QUESTIONS 1-9: 0
3. TROUBLE FALLING OR STAYING ASLEEP: NOT AT ALL
9. THOUGHTS THAT YOU WOULD BE BETTER OFF DEAD, OR OF HURTING YOURSELF: NOT AT ALL
SUM OF ALL RESPONSES TO PHQ QUESTIONS 1-9: 0
6. FEELING BAD ABOUT YOURSELF - OR THAT YOU ARE A FAILURE OR HAVE LET YOURSELF OR YOUR FAMILY DOWN: NOT AT ALL
4. FEELING TIRED OR HAVING LITTLE ENERGY: NOT AT ALL
SUM OF ALL RESPONSES TO PHQ9 QUESTIONS 1 & 2: 0
1. LITTLE INTEREST OR PLEASURE IN DOING THINGS: NOT AT ALL
SUM OF ALL RESPONSES TO PHQ QUESTIONS 1-9: 0
7. TROUBLE CONCENTRATING ON THINGS, SUCH AS READING THE NEWSPAPER OR WATCHING TELEVISION: NOT AT ALL

## 2024-10-01 NOTE — PROGRESS NOTES
Estefania Lund presents today for   Chief Complaint   Patient presents with    New Patient       Estefania Lund preferred language for health care discussion is english/other.    Is someone accompanying this pt? no    Is the patient using any DME equipment during OV? no    Depression Screening:  Depression: Not at risk (10/1/2024)    PHQ-2     PHQ-2 Score: 0        Learning Assessment:  Who is the primary learner? Patient    What is the preferred language for health care of the primary learner? ENGLISH    How does the primary learner prefer to learn new concepts? DEMONSTRATION    Answered By patient    Relationship to Learner SELF           Pt currently taking Anticoagulant therapy? no    Pt currently taking Antiplatelet therapy ? no      Coordination of Care:  1. Have you been to the ER, urgent care clinic since your last visit? Hospitalized since your last visit? no    2. Have you seen or consulted any other health care providers outside of the [facility] since your last visit? Include any pap smears or colon screening. no

## 2024-10-01 NOTE — PROGRESS NOTES
10/01/24     Chief Complaint   Patient presents with    New Patient       HPI:   Estefania Lund is a 74 y.o. White (non-) female with past history of hypertension and some hyperglycemia without tasha diagnosis of diabetes mellitus is seen in cardiovascular evaluation due to problems with syncope.  She relates that she has had problems with presyncope on and off for a couple of years.  In August of this year she was on vacation in [facility] in [place] and suddenly became hot and clammy and dizzy and she was able to lean up against the railing and a whole problem resolved within about 10 minutes.  She does have 4 or 5 of these episodes in the past 2 years but the other episodes were shorter lasting 3 to 5 minutes.  She has never had a tasha fall, but felt that she was going to fall a number of different times.  She does get diaphoretic with exercise that has gotten worse in the past few years and occasionally short of breath with walking, but this is not marked and she denies any problems with exertional chest pain, palpitations, peripheral edema, or tasha syncope.     Past Medical History:   Diagnosis Date    Hyperglycemia     Hypertension      Past Surgical History:   Procedure Laterality Date    STEVEN STEROTACTIC LOC BREAST BIOPSY LEFT Left 3/29/2022    STEVEN STEROTACTIC LOC BREAST BIOPSY LEFT 3/29/2022 [facility] RAD MAMMO     Current Outpatient Medications   Medication Sig Dispense Refill    linaCLOtide (LINZESS) 72 MCG CAPS capsule Take 1 capsule by mouth every morning (before breakfast)      metoprolol succinate (TOPROL XL) 50 MG extended release tablet Take 1 tablet by mouth daily      pravastatin (PRAVACHOL) 20 MG tablet Take 1 tablet by mouth nightly      Sodium Caprylate POWD Take by mouth daily      docusate (COLACE, DULCOLAX) 100 MG CAPS 1 capsule as needed Orally 2 Qam, 1 Qpm for 30 day(s)      sulfamethoxazole-trimethoprim (BACTRIM DS;SEPTRA DS) 800-160 MG per tablet Take 1 tablet by mouth once

## 2024-10-01 NOTE — PATIENT INSTRUCTIONS
Please call if your shortness of breath worsens. Also please call if your shortness of breath is accompanied by diaphoresis (sweating).

## 2025-07-03 RX ORDER — ALBUTEROL SULFATE 90 UG/1
2 INHALANT RESPIRATORY (INHALATION) EVERY 6 HOURS PRN
COMMUNITY

## 2025-07-03 RX ORDER — SENNOSIDES 8.6 MG
1300 CAPSULE ORAL 2 TIMES DAILY
COMMUNITY

## 2025-07-03 NOTE — PROGRESS NOTES
Instructions for your procedure at [facility]      Today's Date: 7/3/2025      Patient's Name: Estefania Lund      Procedure Date: July 15, 2025        Please enter the main entrance of the hospital and check-in at the  located in the lobby.      Do NOT eat or drink anything, including candy, gum, or ice chips after midnight prior to your procedure, unless it is part of your prep.  Brush your teeth before coming to the hospital.You may swish with water, but do not swallow.  No smoking/Vaping/E-Cigarettes 24 hours prior to the day of procedure.  No alcohol 24 hours prior to the day of procedure.  No recreational drugs for one week prior to the day of procedure.  Bring Photo ID, Insurance information, and Co-pay if required on day of procedure.  Bring in pertinent legal documents, such as, Medical Power of , DNR, Advance Directive, etc.  Leave all other valuables, including money/purse, weapons at home.  Remove jewelry, including ALL body piercings, nail polish, acrylic nails, and makeup (including mascara); no lotions, powders, deodorant, and/or perfume/cologne/after shave on the skin.  Glasses and dentures may be worn to the hospital.  They must be removed prior to procedure. Please bring case/container for glasses or dentures.  11. Contacts should not be worn on day of procedure.   12. Call the office ([phone]) if you have symptoms of a cold or illness within 24-48 hours prior to your procedure.   13. AN ADULT (relative or friend 18 years or older) MUST DRIVE YOU HOME AFTER YOUR PROCEDURE.   14. Please make arrangements for a responsible adult (18 years or older) to be with you for 24 hours after your procedure.   15. TWO VISITORS will be allowed in the waiting area during your procedure.       Special Instructions:      Bring list of CURRENT medications.  Follow instructions from the office regarding Bowel Prep, Vitamins, Iron, Blood Thinners, Insulin, Seizure, and

## 2025-07-14 ENCOUNTER — ANESTHESIA EVENT (OUTPATIENT)
Facility: HOSPITAL | Age: 75
End: 2025-07-14
Payer: MEDICARE

## 2025-07-15 ENCOUNTER — ANESTHESIA (OUTPATIENT)
Facility: HOSPITAL | Age: 75
End: 2025-07-15
Payer: MEDICARE

## 2025-07-15 ENCOUNTER — HOSPITAL ENCOUNTER (OUTPATIENT)
Facility: HOSPITAL | Age: 75
Setting detail: OUTPATIENT SURGERY
Discharge: HOME | End: 2025-07-15
Attending: INTERNAL MEDICINE | Admitting: INTERNAL MEDICINE
Payer: MEDICARE

## 2025-07-15 VITALS
HEIGHT: 67 IN | OXYGEN SATURATION: 95 % | RESPIRATION RATE: 12 BRPM | DIASTOLIC BLOOD PRESSURE: 59 MMHG | WEIGHT: 197.8 LBS | HEART RATE: 52 BPM | BODY MASS INDEX: 31.04 KG/M2 | TEMPERATURE: 97.8 F | SYSTOLIC BLOOD PRESSURE: 105 MMHG

## 2025-07-15 PROCEDURE — 3700000000 HC ANESTHESIA ATTENDED CARE: Performed by: INTERNAL MEDICINE

## 2025-07-15 PROCEDURE — 6360000002 HC RX W HCPCS: Performed by: ANESTHESIOLOGY

## 2025-07-15 PROCEDURE — 7100000000 HC PACU RECOVERY - FIRST 15 MIN: Performed by: INTERNAL MEDICINE

## 2025-07-15 PROCEDURE — 3600007502: Performed by: INTERNAL MEDICINE

## 2025-07-15 PROCEDURE — 2580000003 HC RX 258: Performed by: NURSE ANESTHETIST, CERTIFIED REGISTERED

## 2025-07-15 PROCEDURE — 3700000001 HC ADD 15 MINUTES (ANESTHESIA): Performed by: INTERNAL MEDICINE

## 2025-07-15 PROCEDURE — 3600007512: Performed by: INTERNAL MEDICINE

## 2025-07-15 PROCEDURE — 7100000010 HC PHASE II RECOVERY - FIRST 15 MIN: Performed by: INTERNAL MEDICINE

## 2025-07-15 PROCEDURE — 2709999900 HC NON-CHARGEABLE SUPPLY: Performed by: INTERNAL MEDICINE

## 2025-07-15 RX ORDER — SODIUM CHLORIDE, SODIUM LACTATE, POTASSIUM CHLORIDE, CALCIUM CHLORIDE 600; 310; 30; 20 MG/100ML; MG/100ML; MG/100ML; MG/100ML
INJECTION, SOLUTION INTRAVENOUS CONTINUOUS
Status: DISCONTINUED | OUTPATIENT
Start: 2025-07-15 | End: 2025-07-15 | Stop reason: HOSPADM

## 2025-07-15 RX ORDER — SODIUM CHLORIDE 0.9 % (FLUSH) 0.9 %
5-40 SYRINGE (ML) INJECTION PRN
Status: DISCONTINUED | OUTPATIENT
Start: 2025-07-15 | End: 2025-07-15 | Stop reason: HOSPADM

## 2025-07-15 RX ORDER — FAMOTIDINE 20 MG/1
20 TABLET, FILM COATED ORAL ONCE
Status: DISCONTINUED | OUTPATIENT
Start: 2025-07-15 | End: 2025-07-15 | Stop reason: HOSPADM

## 2025-07-15 RX ORDER — LIDOCAINE HYDROCHLORIDE 10 MG/ML
1 INJECTION, SOLUTION EPIDURAL; INFILTRATION; INTRACAUDAL; PERINEURAL
Status: DISCONTINUED | OUTPATIENT
Start: 2025-07-15 | End: 2025-07-15 | Stop reason: HOSPADM

## 2025-07-15 RX ORDER — PROPOFOL 10 MG/ML
INJECTION, EMULSION INTRAVENOUS
Status: DISCONTINUED | OUTPATIENT
Start: 2025-07-15 | End: 2025-07-15 | Stop reason: SDUPTHER

## 2025-07-15 RX ADMIN — PROPOFOL 25 MG: 10 INJECTION, EMULSION INTRAVENOUS at 10:13

## 2025-07-15 RX ADMIN — SODIUM CHLORIDE, SODIUM LACTATE, POTASSIUM CHLORIDE, AND CALCIUM CHLORIDE: .6; .31; .03; .02 INJECTION, SOLUTION INTRAVENOUS at 09:47

## 2025-07-15 RX ADMIN — PROPOFOL 25 MG: 10 INJECTION, EMULSION INTRAVENOUS at 10:05

## 2025-07-15 RX ADMIN — PROPOFOL 25 MG: 10 INJECTION, EMULSION INTRAVENOUS at 10:09

## 2025-07-15 RX ADMIN — PROPOFOL 80 MG: 10 INJECTION, EMULSION INTRAVENOUS at 09:53

## 2025-07-15 RX ADMIN — PROPOFOL 25 MG: 10 INJECTION, EMULSION INTRAVENOUS at 10:01

## 2025-07-15 RX ADMIN — PROPOFOL 25 MG: 10 INJECTION, EMULSION INTRAVENOUS at 09:57

## 2025-07-15 ASSESSMENT — PAIN - FUNCTIONAL ASSESSMENT
PAIN_FUNCTIONAL_ASSESSMENT: 0-10
PAIN_FUNCTIONAL_ASSESSMENT: 0-10

## 2025-07-15 NOTE — ANESTHESIA POSTPROCEDURE EVALUATION
Department of Anesthesiology  Postprocedure Note    Patient: Estefania Lund  MRN: [id number]  YOB: 1950  Date of evaluation: 7/15/2025    Procedure Summary       Date: 07/15/25 Room / Location: [facility] ENDO 02 / [facility] ENDOSCOPY    Anesthesia Start: 0951 Anesthesia Stop: 1020    Procedure: COLONOSCOPY (Abdomen) Diagnosis:       Acute ischemic colitis      FH: colon cancer      Gastroesophageal reflux disease, unspecified whether esophagitis present      Colon cancer screening      Body mass index 34.0-34.9, adult      (Acute ischemic colitis [K55.039])      (FH: colon cancer [Z80.0])      (Gastroesophageal reflux disease, unspecified whether esophagitis present [K21.9])      (Colon cancer screening [Z12.11])      (Body mass index 34.0-34.9, adult [Z68.34])    Surgeons: Pieter Cramer MD Responsible Provider: Ulices Neff MD    Anesthesia Type: MAC ASA Status: 3            Anesthesia Type: MAC    Ivette Phase I: Ivette Score: 10    Ivette Phase II: Ivette Score: 10    Anesthesia Post Evaluation    Patient location during evaluation: bedside  Patient participation: complete - patient participated  Level of consciousness: responsive to verbal stimuli  Airway patency: patent  Nausea & Vomiting: no nausea  Respiratory status: acceptable  Hydration status: euvolemic    No notable events documented.

## 2025-07-15 NOTE — ANESTHESIA PRE PROCEDURE
Department of Anesthesiology  Preprocedure Note       Name:  Estefania Lund   Age:  75 y.o.  :  1950                                          MRN:  [id number]         Date:  7/15/2025      Surgeon: Surgeon(s):  Pieter Cramer MD    Procedure: Procedure(s):  COLONOSCOPY    Medications prior to admission:   Prior to Admission medications    Medication Sig Start Date End Date Taking? Authorizing Provider   Multiple Vitamin (MULTIVITAMIN ADULT PO) Take by mouth   Yes Foreign Ocasio MD   MAGNESIUM GLUCONATE PO Take by mouth daily   Yes Foreign Ocasio MD   Cyanocobalamin (VITAMIN B-12 CR PO) Take by mouth every other day   Yes Foreign Ocasio MD   TURMERIC PO Take 1,000 mg by mouth daily   Yes Foreign Ocasio MD   ELDERBERRY PO Take by mouth daily   Yes Foreign Ocasio MD   diclofenac sodium (VOLTAREN) 1 % GEL Apply topically 4 times daily as needed for Pain   Yes Foreign Ocasio MD   Cranberry 4200 MG CAPS Take by mouth daily   Yes Foreign Ocasio MD   acetaminophen (TYLENOL 8 HOUR) 650 MG extended release tablet Take 2 tablets by mouth in the morning and at bedtime   Yes Foreign Ocasio MD   albuterol sulfate HFA (VENTOLIN HFA) 108 (90 Base) MCG/ACT inhaler Inhale 2 puffs into the lungs every 6 hours as needed for Wheezing   Yes Foreign Ocasio MD   metoprolol succinate (TOPROL XL) 50 MG extended release tablet Take 1 tablet by mouth daily   Yes Foreign Ocasio MD   pravastatin (PRAVACHOL) 20 MG tablet Take 1 tablet by mouth nightly   Yes Foreign Ocasio MD   docusate (COLACE, DULCOLAX) 100 MG CAPS Take by mouth in the morning and at bedtime   Yes Foreign Ocasio MD   sulfamethoxazole-trimethoprim (BACTRIM DS;SEPTRA DS) 800-160 MG per tablet Take 1 tablet by mouth once   Yes Foreign Ocasio MD   Omega-3 Fatty Acids (FISH OIL) 1000 MG capsule Take by mouth daily   Yes Foreign Ocasio MD   cetirizine (ZYRTEC) 10 MG tablet Take 1
